# Patient Record
Sex: MALE | Race: WHITE | NOT HISPANIC OR LATINO | Employment: OTHER | URBAN - METROPOLITAN AREA
[De-identification: names, ages, dates, MRNs, and addresses within clinical notes are randomized per-mention and may not be internally consistent; named-entity substitution may affect disease eponyms.]

---

## 2023-07-05 ENCOUNTER — OFFICE VISIT (OUTPATIENT)
Dept: PRIMARY CARE | Facility: CLINIC | Age: 73
End: 2023-07-05
Payer: MEDICARE

## 2023-07-05 VITALS
TEMPERATURE: 98.4 F | HEIGHT: 70 IN | SYSTOLIC BLOOD PRESSURE: 152 MMHG | BODY MASS INDEX: 26.26 KG/M2 | DIASTOLIC BLOOD PRESSURE: 100 MMHG | WEIGHT: 183.4 LBS | OXYGEN SATURATION: 98 % | RESPIRATION RATE: 16 BRPM

## 2023-07-05 DIAGNOSIS — J40 BRONCHITIS: ICD-10-CM

## 2023-07-05 DIAGNOSIS — J30.2 SEASONAL ALLERGIC RHINITIS, UNSPECIFIED TRIGGER: Primary | ICD-10-CM

## 2023-07-05 DIAGNOSIS — R05.3 PERSISTENT COUGH: ICD-10-CM

## 2023-07-05 PROBLEM — R01.1 HEART MURMUR PREVIOUSLY UNDIAGNOSED: Status: ACTIVE | Noted: 2023-07-05

## 2023-07-05 PROBLEM — R03.0 ELEVATED BLOOD PRESSURE READING WITHOUT DIAGNOSIS OF HYPERTENSION: Status: ACTIVE | Noted: 2023-07-05

## 2023-07-05 PROCEDURE — 1036F TOBACCO NON-USER: CPT | Performed by: INTERNAL MEDICINE

## 2023-07-05 PROCEDURE — 1160F RVW MEDS BY RX/DR IN RCRD: CPT | Performed by: INTERNAL MEDICINE

## 2023-07-05 PROCEDURE — 99214 OFFICE O/P EST MOD 30 MIN: CPT | Performed by: INTERNAL MEDICINE

## 2023-07-05 PROCEDURE — 1159F MED LIST DOCD IN RCRD: CPT | Performed by: INTERNAL MEDICINE

## 2023-07-05 RX ORDER — AZITHROMYCIN 250 MG/1
TABLET, FILM COATED ORAL
Qty: 6 TABLET | Refills: 0 | Status: SHIPPED | OUTPATIENT
Start: 2023-07-05 | End: 2023-07-10

## 2023-07-05 RX ORDER — BENZONATATE 100 MG/1
200 CAPSULE ORAL 3 TIMES DAILY PRN
Qty: 42 CAPSULE | Refills: 0 | Status: SHIPPED | OUTPATIENT
Start: 2023-07-05 | End: 2023-07-21 | Stop reason: ALTCHOICE

## 2023-07-05 RX ORDER — FLUTICASONE PROPIONATE 50 MCG
2 SPRAY, SUSPENSION (ML) NASAL DAILY
Qty: 16 G | Refills: 5 | Status: SHIPPED | OUTPATIENT
Start: 2023-07-05 | End: 2023-07-21 | Stop reason: ALTCHOICE

## 2023-07-05 RX ORDER — METHYLPREDNISOLONE 4 MG/1
TABLET ORAL
Qty: 21 TABLET | Refills: 0 | Status: SHIPPED | OUTPATIENT
Start: 2023-07-05 | End: 2023-07-12

## 2023-07-05 ASSESSMENT — ENCOUNTER SYMPTOMS
OCCASIONAL FEELINGS OF UNSTEADINESS: 0
DEPRESSION: 0
COUGH: 1
LOSS OF SENSATION IN FEET: 0
WHEEZING: 1

## 2023-07-05 ASSESSMENT — PAIN SCALES - GENERAL: PAINLEVEL: 0-NO PAIN
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
## 2023-07-21 ENCOUNTER — OFFICE VISIT (OUTPATIENT)
Dept: PRIMARY CARE | Facility: CLINIC | Age: 73
End: 2023-07-21
Payer: MEDICARE

## 2023-07-21 VITALS
WEIGHT: 183 LBS | DIASTOLIC BLOOD PRESSURE: 98 MMHG | TEMPERATURE: 97.7 F | HEART RATE: 80 BPM | SYSTOLIC BLOOD PRESSURE: 142 MMHG | RESPIRATION RATE: 16 BRPM | BODY MASS INDEX: 26.64 KG/M2

## 2023-07-21 DIAGNOSIS — I10 PRIMARY HYPERTENSION: Primary | ICD-10-CM

## 2023-07-21 PROCEDURE — 1160F RVW MEDS BY RX/DR IN RCRD: CPT | Performed by: NURSE PRACTITIONER

## 2023-07-21 PROCEDURE — 1036F TOBACCO NON-USER: CPT | Performed by: NURSE PRACTITIONER

## 2023-07-21 PROCEDURE — 99213 OFFICE O/P EST LOW 20 MIN: CPT | Performed by: NURSE PRACTITIONER

## 2023-07-21 PROCEDURE — 1126F AMNT PAIN NOTED NONE PRSNT: CPT | Performed by: NURSE PRACTITIONER

## 2023-07-21 PROCEDURE — 3077F SYST BP >= 140 MM HG: CPT | Performed by: NURSE PRACTITIONER

## 2023-07-21 PROCEDURE — 1159F MED LIST DOCD IN RCRD: CPT | Performed by: NURSE PRACTITIONER

## 2023-07-21 PROCEDURE — 3080F DIAST BP >= 90 MM HG: CPT | Performed by: NURSE PRACTITIONER

## 2023-07-21 RX ORDER — LISINOPRIL 10 MG/1
10 TABLET ORAL DAILY
Qty: 30 TABLET | Refills: 5 | Status: SHIPPED | OUTPATIENT
Start: 2023-07-21 | End: 2023-08-23 | Stop reason: SDUPTHER

## 2023-07-21 ASSESSMENT — ENCOUNTER SYMPTOMS
WHEEZING: 0
FEVER: 0
DYSURIA: 0
SORE THROAT: 0
HEADACHES: 0
DIARRHEA: 0
EYE PAIN: 0
DECREASED CONCENTRATION: 0
EYE ITCHING: 0
CHILLS: 0
NERVOUS/ANXIOUS: 0
JOINT SWELLING: 0
SHORTNESS OF BREATH: 0
EYE DISCHARGE: 0
COLOR CHANGE: 0
COUGH: 0
NAUSEA: 0
ADENOPATHY: 0
RHINORRHEA: 0
DIFFICULTY URINATING: 0
CONSTIPATION: 0
PALPITATIONS: 0
BACK PAIN: 0
FATIGUE: 0
VOMITING: 0
MYALGIAS: 0
SINUS PRESSURE: 0

## 2023-07-21 NOTE — PATIENT INSTRUCTIONS
Patient to start taking lisinopril daily. Encouraged to check BP at home a few times per week. Follow-up in 1 month, and call the office if any problems or concerns in the meantime.

## 2023-07-21 NOTE — PROGRESS NOTES
Subjective   Patient ID: Jeramy Reynoso is a 72 y.o. male who presents for No chief complaint on file..  Pt here for blood pressure follow up    URI symptoms and cough have improved. He has not been checking his BP at home for the past few weeks. He has a cuff at home. He has never been on meds in the past.    He drinks water throughout the day, drinks 3-4 cups of coffee in the morning, rarely drinks espresso after dinner, also drinks liquor. Eats some salt with cooking, wife cooks everything from scratch. Rarely eat out, doesn't eat fast food or drink pop. No processed food at all.     Review of Systems   Constitutional:  Negative for chills, fatigue and fever.   HENT:  Negative for congestion, ear pain, rhinorrhea, sinus pressure and sore throat.    Eyes:  Negative for pain, discharge and itching.   Respiratory:  Negative for cough, shortness of breath and wheezing.    Cardiovascular:  Negative for chest pain and palpitations.   Gastrointestinal:  Negative for constipation, diarrhea, nausea and vomiting.   Genitourinary:  Negative for difficulty urinating and dysuria.   Musculoskeletal:  Negative for back pain, joint swelling and myalgias.   Skin:  Negative for color change.   Neurological:  Negative for headaches.   Hematological:  Negative for adenopathy.   Psychiatric/Behavioral:  Negative for decreased concentration. The patient is not nervous/anxious.        Objective     BP (!) 142/98   Pulse 80   Temp 36.5 °C (97.7 °F)   Resp 16   Wt 83 kg (183 lb)   BMI 26.64 kg/m²      Physical Exam  Constitutional:       General: He is not in acute distress.     Appearance: He is not ill-appearing.   HENT:      Head: Normocephalic and atraumatic.      Mouth/Throat:      Mouth: Mucous membranes are moist.      Pharynx: Oropharynx is clear.   Eyes:      Conjunctiva/sclera: Conjunctivae normal.      Pupils: Pupils are equal, round, and reactive to light.   Cardiovascular:      Rate and Rhythm: Normal rate and regular  rhythm.      Pulses: Normal pulses.      Heart sounds: Normal heart sounds.   Pulmonary:      Effort: Pulmonary effort is normal. No respiratory distress.      Breath sounds: Normal breath sounds.   Abdominal:      General: Bowel sounds are normal.      Palpations: Abdomen is soft.      Tenderness: There is no abdominal tenderness.   Musculoskeletal:         General: Normal range of motion.   Skin:     General: Skin is warm and dry.   Neurological:      General: No focal deficit present.      Mental Status: He is alert and oriented to person, place, and time.   Psychiatric:         Mood and Affect: Mood normal.         Behavior: Behavior normal.         Thought Content: Thought content normal.         Judgment: Judgment normal.         Assessment/Plan   Problem List Items Addressed This Visit    None  Visit Diagnoses       Primary hypertension    -  Primary    Relevant Medications    lisinopril 10 mg tablet            Patient Instructions   Patient to start taking lisinopril daily. Encouraged to check BP at home a few times per week. Follow-up in 1 month, and call the office if any problems or concerns in the meantime.

## 2023-08-23 ENCOUNTER — OFFICE VISIT (OUTPATIENT)
Dept: PRIMARY CARE | Facility: CLINIC | Age: 73
End: 2023-08-23
Payer: MEDICARE

## 2023-08-23 VITALS
OXYGEN SATURATION: 99 % | HEART RATE: 75 BPM | TEMPERATURE: 98.2 F | RESPIRATION RATE: 16 BRPM | SYSTOLIC BLOOD PRESSURE: 158 MMHG | BODY MASS INDEX: 26.08 KG/M2 | HEIGHT: 70 IN | WEIGHT: 182.2 LBS | DIASTOLIC BLOOD PRESSURE: 95 MMHG

## 2023-08-23 DIAGNOSIS — I10 PRIMARY HYPERTENSION: Primary | ICD-10-CM

## 2023-08-23 PROCEDURE — 3080F DIAST BP >= 90 MM HG: CPT | Performed by: INTERNAL MEDICINE

## 2023-08-23 PROCEDURE — 1126F AMNT PAIN NOTED NONE PRSNT: CPT | Performed by: INTERNAL MEDICINE

## 2023-08-23 PROCEDURE — 1036F TOBACCO NON-USER: CPT | Performed by: INTERNAL MEDICINE

## 2023-08-23 PROCEDURE — 1160F RVW MEDS BY RX/DR IN RCRD: CPT | Performed by: INTERNAL MEDICINE

## 2023-08-23 PROCEDURE — 3077F SYST BP >= 140 MM HG: CPT | Performed by: INTERNAL MEDICINE

## 2023-08-23 PROCEDURE — 99213 OFFICE O/P EST LOW 20 MIN: CPT | Performed by: INTERNAL MEDICINE

## 2023-08-23 PROCEDURE — 1159F MED LIST DOCD IN RCRD: CPT | Performed by: INTERNAL MEDICINE

## 2023-08-23 RX ORDER — LISINOPRIL 10 MG/1
10 TABLET ORAL DAILY
Qty: 90 TABLET | Refills: 3 | Status: SHIPPED | OUTPATIENT
Start: 2023-08-23 | End: 2023-11-09 | Stop reason: SDUPTHER

## 2023-08-23 ASSESSMENT — PAIN SCALES - GENERAL: PAINLEVEL: 0-NO PAIN

## 2023-08-23 ASSESSMENT — ENCOUNTER SYMPTOMS: HYPERTENSION: 1

## 2023-08-23 NOTE — ASSESSMENT & PLAN NOTE
Doing well with lisinopril and tolerating well.  Will continue to treat and observe and will follow up in 3 months for repeat BP.  Continue to take home BP measurements.  Check BMP today.

## 2023-08-23 NOTE — PROGRESS NOTES
"Subjective   Jeramy Reynoso is a 72 y.o. male who presents for Follow-up and Hypertension.      Patient has been checking BP at home for the last couple weeks- reading have been around 120/60-70   Patient states that he was feeling a little dizzy when first starting the lisinopril for the first 3 days but that has went away and feels good now.    Home BP are fine.   117-136/ 64-70      Hypertension  This is a new problem. The current episode started more than 1 month ago. The problem has been rapidly improving since onset. The problem is controlled. Past treatments include alpha 1 blockers. The current treatment provides significant improvement. There are no compliance problems.        Review of Systems   All other systems reviewed and are negative.      Objective   BP (!) 158/95   Pulse 75   Temp 36.8 °C (98.2 °F)   Resp 16   Ht 1.765 m (5' 9.5\")   Wt 82.6 kg (182 lb 3.2 oz)   SpO2 99%   BMI 26.52 kg/m²       Physical Exam  Constitutional:       Appearance: Normal appearance.   HENT:      Head: Normocephalic.   Pulmonary:      Effort: Pulmonary effort is normal.   Musculoskeletal:      Cervical back: Neck supple.      Right lower leg: No edema.      Left lower leg: No edema.   Skin:     General: Skin is warm and dry.   Psychiatric:         Mood and Affect: Mood normal.         Assessment/Plan   Problem List Items Addressed This Visit       Primary hypertension - Primary     Doing well with lisinopril and tolerating well.  Will continue to treat and observe and will follow up in 3 months for repeat BP.  Continue to take home BP measurements.  Check BMP today.         Relevant Medications    lisinopril 10 mg tablet    Other Relevant Orders    Basic metabolic panel     Encounter Diagnosis   Name Primary?    Primary hypertension Yes     Randal Balderrama, DO   "

## 2023-09-15 ENCOUNTER — LAB (OUTPATIENT)
Dept: LAB | Facility: LAB | Age: 73
End: 2023-09-15
Payer: MEDICARE

## 2023-09-15 DIAGNOSIS — I10 PRIMARY HYPERTENSION: ICD-10-CM

## 2023-09-15 LAB
ANION GAP IN SER/PLAS: 14 MMOL/L (ref 10–20)
CALCIUM (MG/DL) IN SER/PLAS: 9.1 MG/DL (ref 8.6–10.3)
CARBON DIOXIDE, TOTAL (MMOL/L) IN SER/PLAS: 27 MMOL/L (ref 21–32)
CHLORIDE (MMOL/L) IN SER/PLAS: 103 MMOL/L (ref 98–107)
CREATININE (MG/DL) IN SER/PLAS: 1.01 MG/DL (ref 0.5–1.3)
GFR MALE: 79 ML/MIN/1.73M2
GLUCOSE (MG/DL) IN SER/PLAS: 99 MG/DL (ref 74–99)
POTASSIUM (MMOL/L) IN SER/PLAS: 4.6 MMOL/L (ref 3.5–5.3)
SODIUM (MMOL/L) IN SER/PLAS: 139 MMOL/L (ref 136–145)
UREA NITROGEN (MG/DL) IN SER/PLAS: 14 MG/DL (ref 6–23)

## 2023-09-15 PROCEDURE — 36415 COLL VENOUS BLD VENIPUNCTURE: CPT

## 2023-09-15 PROCEDURE — 80048 BASIC METABOLIC PNL TOTAL CA: CPT

## 2023-11-09 ENCOUNTER — OFFICE VISIT (OUTPATIENT)
Dept: PRIMARY CARE | Facility: CLINIC | Age: 73
End: 2023-11-09
Payer: MEDICARE

## 2023-11-09 VITALS
DIASTOLIC BLOOD PRESSURE: 78 MMHG | OXYGEN SATURATION: 99 % | WEIGHT: 183 LBS | SYSTOLIC BLOOD PRESSURE: 136 MMHG | HEIGHT: 70 IN | TEMPERATURE: 97.8 F | RESPIRATION RATE: 16 BRPM | HEART RATE: 80 BPM | BODY MASS INDEX: 26.2 KG/M2

## 2023-11-09 DIAGNOSIS — Z00.00 ROUTINE GENERAL MEDICAL EXAMINATION AT HEALTH CARE FACILITY: Primary | ICD-10-CM

## 2023-11-09 DIAGNOSIS — Z23 NEED FOR VACCINATION: ICD-10-CM

## 2023-11-09 DIAGNOSIS — E78.2 MIXED HYPERLIPIDEMIA: ICD-10-CM

## 2023-11-09 DIAGNOSIS — Z13.6 SCREENING FOR CARDIOVASCULAR CONDITION: ICD-10-CM

## 2023-11-09 DIAGNOSIS — R01.1 HEART MURMUR PREVIOUSLY UNDIAGNOSED: ICD-10-CM

## 2023-11-09 DIAGNOSIS — Z11.59 NEED FOR HEPATITIS C SCREENING TEST: ICD-10-CM

## 2023-11-09 DIAGNOSIS — I10 PRIMARY HYPERTENSION: ICD-10-CM

## 2023-11-09 DIAGNOSIS — Z12.5 ENCOUNTER FOR PROSTATE CANCER SCREENING: ICD-10-CM

## 2023-11-09 PROBLEM — E78.5 HYPERLIPEMIA: Status: ACTIVE | Noted: 2023-11-09

## 2023-11-09 PROBLEM — I34.1 MYXOMATOUS MITRAL VALVE: Status: ACTIVE | Noted: 2023-11-09

## 2023-11-09 PROBLEM — I34.0 MILD MITRAL REGURGITATION: Status: ACTIVE | Noted: 2023-11-09

## 2023-11-09 PROCEDURE — 93000 ELECTROCARDIOGRAM COMPLETE: CPT | Performed by: INTERNAL MEDICINE

## 2023-11-09 PROCEDURE — 3075F SYST BP GE 130 - 139MM HG: CPT | Performed by: INTERNAL MEDICINE

## 2023-11-09 PROCEDURE — 90662 IIV NO PRSV INCREASED AG IM: CPT | Performed by: INTERNAL MEDICINE

## 2023-11-09 PROCEDURE — G0439 PPPS, SUBSEQ VISIT: HCPCS | Performed by: INTERNAL MEDICINE

## 2023-11-09 PROCEDURE — 1159F MED LIST DOCD IN RCRD: CPT | Performed by: INTERNAL MEDICINE

## 2023-11-09 PROCEDURE — 3078F DIAST BP <80 MM HG: CPT | Performed by: INTERNAL MEDICINE

## 2023-11-09 PROCEDURE — G0008 ADMIN INFLUENZA VIRUS VAC: HCPCS | Performed by: INTERNAL MEDICINE

## 2023-11-09 PROCEDURE — 1126F AMNT PAIN NOTED NONE PRSNT: CPT | Performed by: INTERNAL MEDICINE

## 2023-11-09 PROCEDURE — 1036F TOBACCO NON-USER: CPT | Performed by: INTERNAL MEDICINE

## 2023-11-09 PROCEDURE — 1170F FXNL STATUS ASSESSED: CPT | Performed by: INTERNAL MEDICINE

## 2023-11-09 PROCEDURE — 1160F RVW MEDS BY RX/DR IN RCRD: CPT | Performed by: INTERNAL MEDICINE

## 2023-11-09 PROCEDURE — 99213 OFFICE O/P EST LOW 20 MIN: CPT | Performed by: INTERNAL MEDICINE

## 2023-11-09 RX ORDER — LISINOPRIL 10 MG/1
10 TABLET ORAL DAILY
Qty: 90 TABLET | Refills: 3 | Status: SHIPPED | OUTPATIENT
Start: 2023-11-09 | End: 2024-11-08

## 2023-11-09 ASSESSMENT — ACTIVITIES OF DAILY LIVING (ADL)
MANAGING_FINANCES: INDEPENDENT
GROCERY_SHOPPING: INDEPENDENT
TAKING_MEDICATION: INDEPENDENT
BATHING: INDEPENDENT
DRESSING: INDEPENDENT
DOING_HOUSEWORK: INDEPENDENT

## 2023-11-09 ASSESSMENT — ENCOUNTER SYMPTOMS
DEPRESSION: 0
OCCASIONAL FEELINGS OF UNSTEADINESS: 0
LOSS OF SENSATION IN FEET: 0

## 2023-11-09 ASSESSMENT — PAIN SCALES - GENERAL: PAINLEVEL: 0-NO PAIN

## 2023-11-09 ASSESSMENT — PATIENT HEALTH QUESTIONNAIRE - PHQ9
2. FEELING DOWN, DEPRESSED OR HOPELESS: NOT AT ALL
SUM OF ALL RESPONSES TO PHQ9 QUESTIONS 1 AND 2: 0
1. LITTLE INTEREST OR PLEASURE IN DOING THINGS: NOT AT ALL

## 2023-11-09 NOTE — PROGRESS NOTES
"Subjective   Reason for Visit: Jeramy Reynoso is an 73 y.o. male here for a Medicare Wellness visit.     Past Medical, Surgical, and Family History reviewed and updated in chart.    Reviewed all medications by prescribing practitioner or clinical pharmacist (such as prescriptions, OTCs, herbal therapies and supplements) and documented in the medical record.    HPI    Patient Care Team:  Randal Balderrama DO as PCP - General  STANFORD Asher-CNP as PCP - O Medicare Advantage PCP     Review of Systems   Constitutional:  Negative for fever and unexpected weight change.   HENT:  Negative for congestion and ear pain.    Eyes:  Negative for visual disturbance.   Respiratory:  Negative for cough, chest tightness and shortness of breath.    Cardiovascular:  Negative for chest pain, palpitations and leg swelling.   Gastrointestinal:  Negative for abdominal pain, constipation, diarrhea, nausea and vomiting.   Endocrine: Negative for polydipsia and polyuria.   Genitourinary:  Negative for frequency and urgency.   Musculoskeletal:  Negative for arthralgias and neck pain.   Skin:  Negative for rash.   Neurological:  Negative for dizziness and headaches.   Psychiatric/Behavioral:  Negative for dysphoric mood.        Objective   Vitals:  /70   Pulse 80   Temp 36.6 °C (97.8 °F)   Resp 16   Ht 1.765 m (5' 9.5\")   Wt 83 kg (183 lb)   SpO2 99%   BMI 26.64 kg/m²       Physical Exam  Constitutional:       Appearance: Normal appearance.   HENT:      Head: Normocephalic.   Eyes:      Conjunctiva/sclera: Conjunctivae normal.   Cardiovascular:      Rate and Rhythm: Normal rate and regular rhythm.   Pulmonary:      Effort: Pulmonary effort is normal.      Breath sounds: Normal breath sounds.   Musculoskeletal:      Cervical back: Neck supple.   Skin:     General: Skin is warm and dry.   Neurological:      Mental Status: He is alert.         Assessment/Plan   Problem List Items Addressed This Visit    None  Visit " Diagnoses       Routine general medical examination at health care facility    -  Primary    Encounter for prostate cancer screening        Screening for cardiovascular condition        Relevant Orders    ECG 12 lead    Need for vaccination        Relevant Orders    Flu vaccine, high dose seasonal, preservative free    Need for hepatitis C screening test        Relevant Orders    Hepatitis C antibody

## 2023-11-09 NOTE — PROGRESS NOTES
Subjective   Jeramy Reynoso is a 73 y.o. male who presents for Medicare Annual Wellness Visit Subsequent.    Patient has a living will and POA but not on file   Patient declines his yearly flu vaccine   Last Cologuard was 11.22.2022    Patient has had a lot going on at home so has been stressed and his BP has been running a little higher then normal          Review of Systems    Objective   There were no vitals taken for this visit.      Physical Exam    Assessment/Plan   Problem List Items Addressed This Visit    None    No diagnosis found.  Niki Booker MA

## 2023-11-20 ASSESSMENT — ENCOUNTER SYMPTOMS
VOMITING: 0
ARTHRALGIAS: 0
SHORTNESS OF BREATH: 0
DIARRHEA: 0
DIZZINESS: 0
CHEST TIGHTNESS: 0
FEVER: 0
NAUSEA: 0
FREQUENCY: 0
PALPITATIONS: 0
ABDOMINAL PAIN: 0
HEADACHES: 0
POLYDIPSIA: 0
NECK PAIN: 0
CONSTIPATION: 0
COUGH: 0
DYSPHORIC MOOD: 0
UNEXPECTED WEIGHT CHANGE: 0

## 2023-12-01 ENCOUNTER — ANCILLARY PROCEDURE (OUTPATIENT)
Dept: RADIOLOGY | Facility: CLINIC | Age: 73
End: 2023-12-01
Payer: MEDICARE

## 2023-12-01 DIAGNOSIS — Z00.00 ROUTINE GENERAL MEDICAL EXAMINATION AT HEALTH CARE FACILITY: ICD-10-CM

## 2023-12-01 PROCEDURE — 75571 CT HRT W/O DYE W/CA TEST: CPT

## 2024-02-24 ENCOUNTER — APPOINTMENT (OUTPATIENT)
Dept: RADIOLOGY | Facility: HOSPITAL | Age: 74
DRG: 083 | End: 2024-02-24
Payer: MEDICARE

## 2024-02-24 ENCOUNTER — APPOINTMENT (OUTPATIENT)
Dept: CARDIOLOGY | Facility: HOSPITAL | Age: 74
DRG: 083 | End: 2024-02-24
Payer: MEDICARE

## 2024-02-24 ENCOUNTER — HOSPITAL ENCOUNTER (OUTPATIENT)
Facility: HOSPITAL | Age: 74
Setting detail: OBSERVATION
Discharge: HOME | DRG: 083 | End: 2024-02-25
Attending: STUDENT IN AN ORGANIZED HEALTH CARE EDUCATION/TRAINING PROGRAM | Admitting: INTERNAL MEDICINE
Payer: MEDICARE

## 2024-02-24 DIAGNOSIS — S01.01XA LACERATION OF SCALP, INITIAL ENCOUNTER: ICD-10-CM
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
## 2024-02-24 DIAGNOSIS — S22.39XA CLOSED FRACTURE OF ONE RIB, UNSPECIFIED LATERALITY, INITIAL ENCOUNTER: ICD-10-CM

## 2024-02-24 DIAGNOSIS — S06.6X1A TRAUMATIC SUBARACHNOID HEMORRHAGE WITH LOSS OF CONSCIOUSNESS OF 30 MINUTES OR LESS, INITIAL ENCOUNTER (MULTI): ICD-10-CM

## 2024-02-24 LAB
ABO GROUP (TYPE) IN BLOOD: NORMAL
ALBUMIN SERPL BCP-MCNC: 4.3 G/DL (ref 3.4–5)
ALP SERPL-CCNC: 64 U/L (ref 33–136)
ALT SERPL W P-5'-P-CCNC: 29 U/L (ref 10–52)
ANION GAP SERPL CALC-SCNC: 14 MMOL/L (ref 10–20)
ANTIBODY SCREEN: NORMAL
APAP SERPL-MCNC: <10 UG/ML
AST SERPL W P-5'-P-CCNC: 36 U/L (ref 9–39)
BASOPHILS # BLD AUTO: 0.02 X10*3/UL (ref 0–0.1)
BASOPHILS NFR BLD AUTO: 0.3 %
BILIRUB SERPL-MCNC: 0.8 MG/DL (ref 0–1.2)
BUN SERPL-MCNC: 15 MG/DL (ref 6–23)
CALCIUM SERPL-MCNC: 8.8 MG/DL (ref 8.6–10.3)
CHLORIDE SERPL-SCNC: 98 MMOL/L (ref 98–107)
CO2 SERPL-SCNC: 25 MMOL/L (ref 21–32)
CREAT SERPL-MCNC: 1.11 MG/DL (ref 0.5–1.3)
EGFRCR SERPLBLD CKD-EPI 2021: 70 ML/MIN/1.73M*2
EOSINOPHIL # BLD AUTO: 0.05 X10*3/UL (ref 0–0.4)
EOSINOPHIL NFR BLD AUTO: 0.7 %
ERYTHROCYTE [DISTWIDTH] IN BLOOD BY AUTOMATED COUNT: 12.9 % (ref 11.5–14.5)
ETHANOL SERPL-MCNC: 249 MG/DL
GLUCOSE SERPL-MCNC: 119 MG/DL (ref 74–99)
HCT VFR BLD AUTO: 38.4 % (ref 41–52)
HGB BLD-MCNC: 13.4 G/DL (ref 13.5–17.5)
IMM GRANULOCYTES # BLD AUTO: 0.03 X10*3/UL (ref 0–0.5)
IMM GRANULOCYTES NFR BLD AUTO: 0.4 % (ref 0–0.9)
INR PPP: 2 (ref 0.9–1.1)
LACTATE SERPL-SCNC: 1.4 MMOL/L (ref 0.4–2)
LYMPHOCYTES # BLD AUTO: 1.18 X10*3/UL (ref 0.8–3)
LYMPHOCYTES NFR BLD AUTO: 16.8 %
MCH RBC QN AUTO: 34.9 PG (ref 26–34)
MCHC RBC AUTO-ENTMCNC: 34.9 G/DL (ref 32–36)
MCV RBC AUTO: 100 FL (ref 80–100)
MONOCYTES # BLD AUTO: 0.37 X10*3/UL (ref 0.05–0.8)
MONOCYTES NFR BLD AUTO: 5.3 %
NEUTROPHILS # BLD AUTO: 5.38 X10*3/UL (ref 1.6–5.5)
NEUTROPHILS NFR BLD AUTO: 76.5 %
NRBC BLD-RTO: 0 /100 WBCS (ref 0–0)
PLATELET # BLD AUTO: 109 X10*3/UL (ref 150–450)
POTASSIUM SERPL-SCNC: 4.1 MMOL/L (ref 3.5–5.3)
PROT SERPL-MCNC: 7.1 G/DL (ref 6.4–8.2)
PROTHROMBIN TIME: 23.1 SECONDS (ref 9.8–12.8)
RBC # BLD AUTO: 3.84 X10*6/UL (ref 4.5–5.9)
RH FACTOR (ANTIGEN D): NORMAL
SALICYLATES SERPL-MCNC: <3 MG/DL
SODIUM SERPL-SCNC: 133 MMOL/L (ref 136–145)
WBC # BLD AUTO: 7 X10*3/UL (ref 4.4–11.3)

## 2024-02-24 PROCEDURE — 74177 CT ABD & PELVIS W/CONTRAST: CPT

## 2024-02-24 PROCEDURE — 72125 CT NECK SPINE W/O DYE: CPT

## 2024-02-24 PROCEDURE — 74177 CT ABD & PELVIS W/CONTRAST: CPT | Performed by: STUDENT IN AN ORGANIZED HEALTH CARE EDUCATION/TRAINING PROGRAM

## 2024-02-24 PROCEDURE — 80053 COMPREHEN METABOLIC PANEL: CPT | Performed by: STUDENT IN AN ORGANIZED HEALTH CARE EDUCATION/TRAINING PROGRAM

## 2024-02-24 PROCEDURE — 72128 CT CHEST SPINE W/O DYE: CPT

## 2024-02-24 PROCEDURE — 85025 COMPLETE CBC W/AUTO DIFF WBC: CPT | Performed by: STUDENT IN AN ORGANIZED HEALTH CARE EDUCATION/TRAINING PROGRAM

## 2024-02-24 PROCEDURE — 93005 ELECTROCARDIOGRAM TRACING: CPT

## 2024-02-24 PROCEDURE — 36415 COLL VENOUS BLD VENIPUNCTURE: CPT | Performed by: STUDENT IN AN ORGANIZED HEALTH CARE EDUCATION/TRAINING PROGRAM

## 2024-02-24 PROCEDURE — 83605 ASSAY OF LACTIC ACID: CPT | Performed by: STUDENT IN AN ORGANIZED HEALTH CARE EDUCATION/TRAINING PROGRAM

## 2024-02-24 PROCEDURE — 80143 DRUG ASSAY ACETAMINOPHEN: CPT | Performed by: STUDENT IN AN ORGANIZED HEALTH CARE EDUCATION/TRAINING PROGRAM

## 2024-02-24 PROCEDURE — 70450 CT HEAD/BRAIN W/O DYE: CPT | Performed by: STUDENT IN AN ORGANIZED HEALTH CARE EDUCATION/TRAINING PROGRAM

## 2024-02-24 PROCEDURE — 70450 CT HEAD/BRAIN W/O DYE: CPT

## 2024-02-24 PROCEDURE — 2550000001 HC RX 255 CONTRASTS: Performed by: STUDENT IN AN ORGANIZED HEALTH CARE EDUCATION/TRAINING PROGRAM

## 2024-02-24 PROCEDURE — 93010 ELECTROCARDIOGRAM REPORT: CPT | Performed by: INTERNAL MEDICINE

## 2024-02-24 PROCEDURE — 2500000004 HC RX 250 GENERAL PHARMACY W/ HCPCS (ALT 636 FOR OP/ED): Performed by: STUDENT IN AN ORGANIZED HEALTH CARE EDUCATION/TRAINING PROGRAM

## 2024-02-24 PROCEDURE — 86850 RBC ANTIBODY SCREEN: CPT | Performed by: STUDENT IN AN ORGANIZED HEALTH CARE EDUCATION/TRAINING PROGRAM

## 2024-02-24 PROCEDURE — 2500000004 HC RX 250 GENERAL PHARMACY W/ HCPCS (ALT 636 FOR OP/ED)

## 2024-02-24 PROCEDURE — 72128 CT CHEST SPINE W/O DYE: CPT | Performed by: STUDENT IN AN ORGANIZED HEALTH CARE EDUCATION/TRAINING PROGRAM

## 2024-02-24 PROCEDURE — 99291 CRITICAL CARE FIRST HOUR: CPT

## 2024-02-24 PROCEDURE — 96365 THER/PROPH/DIAG IV INF INIT: CPT

## 2024-02-24 PROCEDURE — 99291 CRITICAL CARE FIRST HOUR: CPT | Mod: 25 | Performed by: STUDENT IN AN ORGANIZED HEALTH CARE EDUCATION/TRAINING PROGRAM

## 2024-02-24 PROCEDURE — 85610 PROTHROMBIN TIME: CPT | Performed by: STUDENT IN AN ORGANIZED HEALTH CARE EDUCATION/TRAINING PROGRAM

## 2024-02-24 PROCEDURE — 72131 CT LUMBAR SPINE W/O DYE: CPT

## 2024-02-24 PROCEDURE — 71260 CT THORAX DX C+: CPT | Performed by: STUDENT IN AN ORGANIZED HEALTH CARE EDUCATION/TRAINING PROGRAM

## 2024-02-24 PROCEDURE — 72125 CT NECK SPINE W/O DYE: CPT | Performed by: STUDENT IN AN ORGANIZED HEALTH CARE EDUCATION/TRAINING PROGRAM

## 2024-02-24 PROCEDURE — G0390 TRAUMA RESPONS W/HOSP CRITI: HCPCS

## 2024-02-24 PROCEDURE — 72131 CT LUMBAR SPINE W/O DYE: CPT | Performed by: STUDENT IN AN ORGANIZED HEALTH CARE EDUCATION/TRAINING PROGRAM

## 2024-02-24 PROCEDURE — 99291 CRITICAL CARE FIRST HOUR: CPT | Performed by: STUDENT IN AN ORGANIZED HEALTH CARE EDUCATION/TRAINING PROGRAM

## 2024-02-24 RX ORDER — CEFAZOLIN SODIUM 2 G/50ML
2 SOLUTION INTRAVENOUS ONCE
Status: COMPLETED | OUTPATIENT
Start: 2024-02-24 | End: 2024-02-24

## 2024-02-24 RX ADMIN — CEFAZOLIN SODIUM 2 G: 2 SOLUTION INTRAVENOUS at 22:20

## 2024-02-24 RX ADMIN — IOHEXOL 100 ML: 350 INJECTION, SOLUTION INTRAVENOUS at 21:35

## 2024-02-24 RX ADMIN — SODIUM CHLORIDE 1000 ML: 9 INJECTION, SOLUTION INTRAVENOUS at 23:05

## 2024-02-24 ASSESSMENT — LIFESTYLE VARIABLES
EVER HAD A DRINK FIRST THING IN THE MORNING TO STEADY YOUR NERVES TO GET RID OF A HANGOVER: NO
HAVE PEOPLE ANNOYED YOU BY CRITICIZING YOUR DRINKING: NO
HAVE YOU EVER FELT YOU SHOULD CUT DOWN ON YOUR DRINKING: NO
EVER FELT BAD OR GUILTY ABOUT YOUR DRINKING: NO

## 2024-02-24 ASSESSMENT — PAIN SCALES - GENERAL: PAINLEVEL_OUTOF10: 0 - NO PAIN

## 2024-02-24 ASSESSMENT — PAIN - FUNCTIONAL ASSESSMENT: PAIN_FUNCTIONAL_ASSESSMENT: 0-10

## 2024-02-25 ENCOUNTER — APPOINTMENT (OUTPATIENT)
Dept: RADIOLOGY | Facility: HOSPITAL | Age: 74
DRG: 083 | End: 2024-02-25
Payer: MEDICARE

## 2024-02-25 VITALS
RESPIRATION RATE: 16 BRPM | BODY MASS INDEX: 26.67 KG/M2 | DIASTOLIC BLOOD PRESSURE: 78 MMHG | SYSTOLIC BLOOD PRESSURE: 142 MMHG | WEIGHT: 186.29 LBS | HEIGHT: 70 IN | TEMPERATURE: 98.2 F | HEART RATE: 104 BPM | OXYGEN SATURATION: 98 %

## 2024-02-25 LAB
ALBUMIN SERPL BCP-MCNC: 3.5 G/DL (ref 3.4–5)
ANION GAP SERPL CALC-SCNC: 13 MMOL/L (ref 10–20)
BASOPHILS # BLD AUTO: 0.02 X10*3/UL (ref 0–0.1)
BASOPHILS NFR BLD AUTO: 0.3 %
BUN SERPL-MCNC: 11 MG/DL (ref 6–23)
CALCIUM SERPL-MCNC: 7.9 MG/DL (ref 8.6–10.3)
CHLORIDE SERPL-SCNC: 107 MMOL/L (ref 98–107)
CO2 SERPL-SCNC: 19 MMOL/L (ref 21–32)
CREAT SERPL-MCNC: 0.9 MG/DL (ref 0.5–1.3)
EGFRCR SERPLBLD CKD-EPI 2021: 90 ML/MIN/1.73M*2
EOSINOPHIL # BLD AUTO: 0.01 X10*3/UL (ref 0–0.4)
EOSINOPHIL NFR BLD AUTO: 0.2 %
ERYTHROCYTE [DISTWIDTH] IN BLOOD BY AUTOMATED COUNT: 12.7 % (ref 11.5–14.5)
GLUCOSE SERPL-MCNC: 126 MG/DL (ref 74–99)
HCT VFR BLD AUTO: 38.9 % (ref 41–52)
HGB BLD-MCNC: 12.9 G/DL (ref 13.5–17.5)
IMM GRANULOCYTES # BLD AUTO: 0.02 X10*3/UL (ref 0–0.5)
IMM GRANULOCYTES NFR BLD AUTO: 0.3 % (ref 0–0.9)
LYMPHOCYTES # BLD AUTO: 1.11 X10*3/UL (ref 0.8–3)
LYMPHOCYTES NFR BLD AUTO: 17.5 %
MAGNESIUM SERPL-MCNC: 1.98 MG/DL (ref 1.6–2.4)
MCH RBC QN AUTO: 33.9 PG (ref 26–34)
MCHC RBC AUTO-ENTMCNC: 33.2 G/DL (ref 32–36)
MCV RBC AUTO: 102 FL (ref 80–100)
MONOCYTES # BLD AUTO: 0.43 X10*3/UL (ref 0.05–0.8)
MONOCYTES NFR BLD AUTO: 6.8 %
NEUTROPHILS # BLD AUTO: 4.74 X10*3/UL (ref 1.6–5.5)
NEUTROPHILS NFR BLD AUTO: 74.9 %
NRBC BLD-RTO: 0 /100 WBCS (ref 0–0)
PHOSPHATE SERPL-MCNC: 2.3 MG/DL (ref 2.5–4.9)
PLATELET # BLD AUTO: 107 X10*3/UL (ref 150–450)
POTASSIUM SERPL-SCNC: 4.1 MMOL/L (ref 3.5–5.3)
RBC # BLD AUTO: 3.8 X10*6/UL (ref 4.5–5.9)
SODIUM SERPL-SCNC: 135 MMOL/L (ref 136–145)
WBC # BLD AUTO: 6.3 X10*3/UL (ref 4.4–11.3)

## 2024-02-25 PROCEDURE — 2500000001 HC RX 250 WO HCPCS SELF ADMINISTERED DRUGS (ALT 637 FOR MEDICARE OP)

## 2024-02-25 PROCEDURE — 70450 CT HEAD/BRAIN W/O DYE: CPT | Performed by: STUDENT IN AN ORGANIZED HEALTH CARE EDUCATION/TRAINING PROGRAM

## 2024-02-25 PROCEDURE — 36415 COLL VENOUS BLD VENIPUNCTURE: CPT

## 2024-02-25 PROCEDURE — 99221 1ST HOSP IP/OBS SF/LOW 40: CPT | Performed by: NEUROLOGICAL SURGERY

## 2024-02-25 PROCEDURE — 85025 COMPLETE CBC W/AUTO DIFF WBC: CPT

## 2024-02-25 PROCEDURE — G0378 HOSPITAL OBSERVATION PER HR: HCPCS

## 2024-02-25 PROCEDURE — 99238 HOSP IP/OBS DSCHRG MGMT 30/<: CPT

## 2024-02-25 PROCEDURE — 99221 1ST HOSP IP/OBS SF/LOW 40: CPT | Performed by: SURGERY

## 2024-02-25 PROCEDURE — 80069 RENAL FUNCTION PANEL: CPT

## 2024-02-25 PROCEDURE — 83735 ASSAY OF MAGNESIUM: CPT

## 2024-02-25 PROCEDURE — 70450 CT HEAD/BRAIN W/O DYE: CPT

## 2024-02-25 PROCEDURE — 2020000001 HC ICU ROOM DAILY

## 2024-02-25 RX ORDER — LIDOCAINE 50 MG/G
1 PATCH TOPICAL DAILY
Qty: 30 PATCH | Refills: 0 | Status: SHIPPED | OUTPATIENT
Start: 2024-02-25 | End: 2024-03-15

## 2024-02-25 RX ORDER — LISINOPRIL 10 MG/1
10 TABLET ORAL DAILY
Status: DISCONTINUED | OUTPATIENT
Start: 2024-02-25 | End: 2024-02-25 | Stop reason: HOSPADM

## 2024-02-25 RX ORDER — OXYCODONE HYDROCHLORIDE 5 MG/1
5 TABLET ORAL EVERY 6 HOURS PRN
Qty: 5 TABLET | Refills: 0 | Status: SHIPPED | OUTPATIENT
Start: 2024-02-25 | End: 2024-05-16 | Stop reason: ALTCHOICE

## 2024-02-25 RX ORDER — ASCORBIC ACID 1000 MG
175 TABLET ORAL DAILY
COMMUNITY

## 2024-02-25 RX ORDER — LABETALOL HYDROCHLORIDE 5 MG/ML
10 INJECTION, SOLUTION INTRAVENOUS EVERY 4 HOURS PRN
Status: DISCONTINUED | OUTPATIENT
Start: 2024-02-25 | End: 2024-02-25 | Stop reason: HOSPADM

## 2024-02-25 RX ADMIN — LISINOPRIL 10 MG: 10 TABLET ORAL at 08:45

## 2024-02-25 SDOH — SOCIAL STABILITY: SOCIAL INSECURITY: DO YOU FEEL ANYONE HAS EXPLOITED OR TAKEN ADVANTAGE OF YOU FINANCIALLY OR OF YOUR PERSONAL PROPERTY?: NO

## 2024-02-25 SDOH — SOCIAL STABILITY: SOCIAL INSECURITY: ARE THERE ANY APPARENT SIGNS OF INJURIES/BEHAVIORS THAT COULD BE RELATED TO ABUSE/NEGLECT?: NO

## 2024-02-25 SDOH — SOCIAL STABILITY: SOCIAL INSECURITY: ARE YOU OR HAVE YOU BEEN THREATENED OR ABUSED PHYSICALLY, EMOTIONALLY, OR SEXUALLY BY ANYONE?: NO

## 2024-02-25 SDOH — SOCIAL STABILITY: SOCIAL INSECURITY: DO YOU FEEL UNSAFE GOING BACK TO THE PLACE WHERE YOU ARE LIVING?: NO

## 2024-02-25 SDOH — SOCIAL STABILITY: SOCIAL INSECURITY: WERE YOU ABLE TO COMPLETE ALL THE BEHAVIORAL HEALTH SCREENINGS?: YES

## 2024-02-25 SDOH — SOCIAL STABILITY: SOCIAL INSECURITY: DOES ANYONE TRY TO KEEP YOU FROM HAVING/CONTACTING OTHER FRIENDS OR DOING THINGS OUTSIDE YOUR HOME?: NO

## 2024-02-25 SDOH — SOCIAL STABILITY: SOCIAL INSECURITY: HAS ANYONE EVER THREATENED TO HURT YOUR FAMILY OR YOUR PETS?: NO

## 2024-02-25 SDOH — SOCIAL STABILITY: SOCIAL INSECURITY: HAVE YOU HAD THOUGHTS OF HARMING ANYONE ELSE?: NO

## 2024-02-25 SDOH — SOCIAL STABILITY: SOCIAL INSECURITY: ABUSE: ADULT

## 2024-02-25 ASSESSMENT — COLUMBIA-SUICIDE SEVERITY RATING SCALE - C-SSRS
1. IN THE PAST MONTH, HAVE YOU WISHED YOU WERE DEAD OR WISHED YOU COULD GO TO SLEEP AND NOT WAKE UP?: NO
2. HAVE YOU ACTUALLY HAD ANY THOUGHTS OF KILLING YOURSELF?: NO
6. HAVE YOU EVER DONE ANYTHING, STARTED TO DO ANYTHING, OR PREPARED TO DO ANYTHING TO END YOUR LIFE?: NO

## 2024-02-25 ASSESSMENT — COGNITIVE AND FUNCTIONAL STATUS - GENERAL
MOBILITY SCORE: 18
TURNING FROM BACK TO SIDE WHILE IN FLAT BAD: A LITTLE
STANDING UP FROM CHAIR USING ARMS: A LITTLE
WALKING IN HOSPITAL ROOM: A LITTLE
DRESSING REGULAR UPPER BODY CLOTHING: A LITTLE
TURNING FROM BACK TO SIDE WHILE IN FLAT BAD: A LITTLE
HELP NEEDED FOR BATHING: A LITTLE
WALKING IN HOSPITAL ROOM: A LITTLE
MOBILITY SCORE: 18
DAILY ACTIVITIY SCORE: 20
STANDING UP FROM CHAIR USING ARMS: A LITTLE
DRESSING REGULAR LOWER BODY CLOTHING: A LITTLE
DAILY ACTIVITIY SCORE: 20
MOVING TO AND FROM BED TO CHAIR: A LITTLE
MOVING TO AND FROM BED TO CHAIR: A LITTLE
CLIMB 3 TO 5 STEPS WITH RAILING: A LITTLE
DRESSING REGULAR UPPER BODY CLOTHING: A LITTLE
PATIENT BASELINE BEDBOUND: NO
TOILETING: A LITTLE
HELP NEEDED FOR BATHING: A LITTLE
CLIMB 3 TO 5 STEPS WITH RAILING: A LITTLE
MOVING FROM LYING ON BACK TO SITTING ON SIDE OF FLAT BED WITH BEDRAILS: A LITTLE
MOVING FROM LYING ON BACK TO SITTING ON SIDE OF FLAT BED WITH BEDRAILS: A LITTLE
DRESSING REGULAR LOWER BODY CLOTHING: A LITTLE
TOILETING: A LITTLE

## 2024-02-25 ASSESSMENT — PAIN - FUNCTIONAL ASSESSMENT
PAIN_FUNCTIONAL_ASSESSMENT: 0-10

## 2024-02-25 ASSESSMENT — ACTIVITIES OF DAILY LIVING (ADL)
TOILETING: INDEPENDENT
GROOMING: INDEPENDENT
JUDGMENT_ADEQUATE_SAFELY_COMPLETE_DAILY_ACTIVITIES: YES
BATHING: INDEPENDENT
WALKS IN HOME: INDEPENDENT
PATIENT'S MEMORY ADEQUATE TO SAFELY COMPLETE DAILY ACTIVITIES?: YES
HEARING - LEFT EAR: FUNCTIONAL
FEEDING YOURSELF: INDEPENDENT
ADEQUATE_TO_COMPLETE_ADL: YES
LACK_OF_TRANSPORTATION: NO
ASSISTIVE_DEVICE: EYEGLASSES
HEARING - RIGHT EAR: FUNCTIONAL
LACK_OF_TRANSPORTATION: NO
DRESSING YOURSELF: NEEDS ASSISTANCE

## 2024-02-25 ASSESSMENT — LIFESTYLE VARIABLES
SUBSTANCE_ABUSE_PAST_12_MONTHS: YES
AUDIT TOTAL SCORE: 5
HOW OFTEN DURING THE LAST YEAR HAVE YOU NEEDED AN ALCOHOLIC DRINK FIRST THING IN THE MORNING TO GET YOURSELF GOING AFTER A NIGHT OF HEAVY DRINKING: NEVER
AUDIT-C TOTAL SCORE: 5
HOW OFTEN DURING THE LAST YEAR HAVE YOU FOUND THAT YOU WERE NOT ABLE TO STOP DRINKING ONCE YOU HAD STARTED: NEVER
HAS A RELATIVE, FRIEND, DOCTOR, OR ANOTHER HEALTH PROFESSIONAL EXPRESSED CONCERN ABOUT YOUR DRINKING OR SUGGESTED YOU CUT DOWN: NO
HOW OFTEN DO YOU HAVE A DRINK CONTAINING ALCOHOL: 4 OR MORE TIMES A WEEK
HOW OFTEN DURING THE LAST YEAR HAVE YOU BEEN UNABLE TO REMEMBER WHAT HAPPENED THE NIGHT BEFORE BECAUSE YOU HAD BEEN DRINKING: NEVER
HOW OFTEN DURING THE LAST YEAR HAVE YOU HAD A FEELING OF GUILT OR REMORSE AFTER DRINKING: NEVER
PRESCIPTION_ABUSE_PAST_12_MONTHS: NO
HOW OFTEN DO YOU HAVE 6 OR MORE DRINKS ON ONE OCCASION: NEVER
AUDIT TOTAL SCORE: 0
AUDIT-C TOTAL SCORE: 5
HAVE YOU OR SOMEONE ELSE BEEN INJURED AS A RESULT OF YOUR DRINKING: NO
SKIP TO QUESTIONS 9-10: 0
HOW MANY STANDARD DRINKS CONTAINING ALCOHOL DO YOU HAVE ON A TYPICAL DAY: 3 OR 4
HOW OFTEN DURING THE LAST YEAR HAVE YOU FAILED TO DO WHAT WAS NORMALLY EXPECTED FROM YOU BECAUSE OF DRINKING: NEVER

## 2024-02-25 ASSESSMENT — PAIN SCALES - GENERAL
PAINLEVEL_OUTOF10: 0 - NO PAIN
PAINLEVEL_OUTOF10: 8
PAINLEVEL_OUTOF10: 0 - NO PAIN
PAINLEVEL_OUTOF10: 8

## 2024-02-25 ASSESSMENT — PATIENT HEALTH QUESTIONNAIRE - PHQ9
SUM OF ALL RESPONSES TO PHQ9 QUESTIONS 1 & 2: 0
1. LITTLE INTEREST OR PLEASURE IN DOING THINGS: NOT AT ALL
2. FEELING DOWN, DEPRESSED OR HOPELESS: NOT AT ALL

## 2024-02-25 NOTE — NURSING NOTE
Patient returned from CT scan of head.  No changes in patient condition during transport or testing.

## 2024-02-25 NOTE — DISCHARGE SUMMARY
Discharge Diagnosis  Fall, initial encounter    Issues Requiring Follow-Up  Laceration on the occipital region , Acute nondisplaced fracture of the posterior left 5th rib     Test Results Pending At Discharge  Pending Labs       No current pending labs.            Hospital Course   Jeramy Reynoso is a 73 y.o. male presenting after a mechanical fall secondary to intoxication at home. approximately around 8 PM last night. Past medical history is notable for mitral regurgitation, HLD, hypertension. His wife heard a loud noise from a different room.  He was found on the ground bleeding from the back of his head down several stairs on a concrete floor. He was reportedly confused at the time.  Unknown loss of consciousness.  Patient reportedly had 3 alcoholic drinks this evening and this is standard for him on a daily basis.  Patient's mental status gradually improved throughout his time in the emergency department.  He is not on blood thinners. He was admitted to the ICU on Neurosurgery recommended for frequent neurochecks in the ICU with repeat head CT in 6 hours. Repeat CT head shows slight decrease in amount of subarachnoid hemorrhage along the right parietal lobe. No new or enlarging acute intracranial  Hemorrhage, and decreased size of left parietal scalp hematoma. The patient had no over night event. During rounds, he is A&Ox3, hemodynamically stable, neurologically intact, no sensory or motor deficit, No respiratory distress. Tolerated oral intake, Denies headache, dizziness, lightheadedness, no chest pain or SOB, no nausea, vomiting, abdominal pain or diarrhea, or urinary symptom. He was seen by Trauma Surgery and Neurosurgery team and was cleared to be discharged for outpatient Neurosurgery follow up. He was also cleared by for discharge by the intensive care team pending PT/OT evaluation. Cleared to go home by physical therapy. The patient was discharged in a stable condition to follow up with his PCP and  Neurosurgery.        ED: on arrival, the patient initial GCS of 14 secondary to confusion. Secondary survey remarkable for laceration to the posterior scalp. Full body CT imaging was obtained to evaluate for extent of injury. CT head revealed left parietal scalp hematoma, and subarachnoid bleed in the right parietal region with no mass effect. CT imaging also revealed acute rib fracture of the left fifth rib, nondisplaced. Patient was given 1 dose of Ancef in ED. Remaining lab work reviewed, remarkable for alcohol level of 249, pro time of 23.1 and INR of 2.0. Patient had stable vital signs, ANO x 4. He had no acute deficits on neurological exam.     Results for orders placed or performed during the hospital encounter of 02/24/24 (from the past 24 hour(s))   Comprehensive Metabolic Panel   Result Value Ref Range    Glucose 119 (H) 74 - 99 mg/dL    Sodium 133 (L) 136 - 145 mmol/L    Potassium 4.1 3.5 - 5.3 mmol/L    Chloride 98 98 - 107 mmol/L    Bicarbonate 25 21 - 32 mmol/L    Anion Gap 14 10 - 20 mmol/L    Urea Nitrogen 15 6 - 23 mg/dL    Creatinine 1.11 0.50 - 1.30 mg/dL    eGFR 70 >60 mL/min/1.73m*2    Calcium 8.8 8.6 - 10.3 mg/dL    Albumin 4.3 3.4 - 5.0 g/dL    Alkaline Phosphatase 64 33 - 136 U/L    Total Protein 7.1 6.4 - 8.2 g/dL    AST 36 9 - 39 U/L    Bilirubin, Total 0.8 0.0 - 1.2 mg/dL    ALT 29 10 - 52 U/L   Lactate   Result Value Ref Range    Lactate 1.4 0.4 - 2.0 mmol/L   Protime-INR   Result Value Ref Range    Protime 23.1 (H) 9.8 - 12.8 seconds    INR 2.0 (H) 0.9 - 1.1   Type And Screen   Result Value Ref Range    ABO TYPE A     Rh TYPE POS     ANTIBODY SCREEN NEG    Acute Toxicology Panel, Blood   Result Value Ref Range    Acetaminophen <10.0 10.0 - 30.0 ug/mL    Salicylate  <3 4 - 20 mg/dL    Alcohol 249 (H) <=10 mg/dL   CBC and Auto Differential   Result Value Ref Range    WBC 7.0 4.4 - 11.3 x10*3/uL    nRBC 0.0 0.0 - 0.0 /100 WBCs    RBC 3.84 (L) 4.50 - 5.90 x10*6/uL    Hemoglobin 13.4 (L) 13.5  - 17.5 g/dL    Hematocrit 38.4 (L) 41.0 - 52.0 %     80 - 100 fL    MCH 34.9 (H) 26.0 - 34.0 pg    MCHC 34.9 32.0 - 36.0 g/dL    RDW 12.9 11.5 - 14.5 %    Platelets 109 (L) 150 - 450 x10*3/uL    Neutrophils % 76.5 40.0 - 80.0 %    Immature Granulocytes %, Automated 0.4 0.0 - 0.9 %    Lymphocytes % 16.8 13.0 - 44.0 %    Monocytes % 5.3 2.0 - 10.0 %    Eosinophils % 0.7 0.0 - 6.0 %    Basophils % 0.3 0.0 - 2.0 %    Neutrophils Absolute 5.38 1.60 - 5.50 x10*3/uL    Immature Granulocytes Absolute, Automated 0.03 0.00 - 0.50 x10*3/uL    Lymphocytes Absolute 1.18 0.80 - 3.00 x10*3/uL    Monocytes Absolute 0.37 0.05 - 0.80 x10*3/uL    Eosinophils Absolute 0.05 0.00 - 0.40 x10*3/uL    Basophils Absolute 0.02 0.00 - 0.10 x10*3/uL   CBC and Auto Differential   Result Value Ref Range    WBC 6.3 4.4 - 11.3 x10*3/uL    nRBC 0.0 0.0 - 0.0 /100 WBCs    RBC 3.80 (L) 4.50 - 5.90 x10*6/uL    Hemoglobin 12.9 (L) 13.5 - 17.5 g/dL    Hematocrit 38.9 (L) 41.0 - 52.0 %     (H) 80 - 100 fL    MCH 33.9 26.0 - 34.0 pg    MCHC 33.2 32.0 - 36.0 g/dL    RDW 12.7 11.5 - 14.5 %    Platelets 107 (L) 150 - 450 x10*3/uL    Neutrophils % 74.9 40.0 - 80.0 %    Immature Granulocytes %, Automated 0.3 0.0 - 0.9 %    Lymphocytes % 17.5 13.0 - 44.0 %    Monocytes % 6.8 2.0 - 10.0 %    Eosinophils % 0.2 0.0 - 6.0 %    Basophils % 0.3 0.0 - 2.0 %    Neutrophils Absolute 4.74 1.60 - 5.50 x10*3/uL    Immature Granulocytes Absolute, Automated 0.02 0.00 - 0.50 x10*3/uL    Lymphocytes Absolute 1.11 0.80 - 3.00 x10*3/uL    Monocytes Absolute 0.43 0.05 - 0.80 x10*3/uL    Eosinophils Absolute 0.01 0.00 - 0.40 x10*3/uL    Basophils Absolute 0.02 0.00 - 0.10 x10*3/uL   Magnesium   Result Value Ref Range    Magnesium 1.98 1.60 - 2.40 mg/dL   Renal function panel   Result Value Ref Range    Glucose 126 (H) 74 - 99 mg/dL    Sodium 135 (L) 136 - 145 mmol/L    Potassium 4.1 3.5 - 5.3 mmol/L    Chloride 107 98 - 107 mmol/L    Bicarbonate 19 (L) 21 - 32 mmol/L     Anion Gap 13 10 - 20 mmol/L    Urea Nitrogen 11 6 - 23 mg/dL    Creatinine 0.90 0.50 - 1.30 mg/dL    eGFR 90 >60 mL/min/1.73m*2    Calcium 7.9 (L) 8.6 - 10.3 mg/dL    Phosphorus 2.3 (L) 2.5 - 4.9 mg/dL    Albumin 3.5 3.4 - 5.0 g/dL          CT head wo IV contrast    Result Date: 2/25/2024  Interpreted By:  Axel Barone, STUDY: CT HEAD WO IV CONTRAST;  2/25/2024 4:43 am   INDICATION: Signs/Symptoms:interval head CT for SAH.   COMPARISON: 02/24/2024   ACCESSION NUMBER(S): [id number]   ORDERING CLINICIAN: MELE GONG   TECHNIQUE: Noncontrast axial CT images of head were obtained with coronal and sagittal reconstructed images.   FINDINGS: BRAIN PARENCHYMA:  No acute intraparenchymal hemorrhage or parenchymal evidence of acute large territory ischemic infarct. No mass-effect. Gray-white matter distinction is preserved.   VENTRICLES and EXTRA-AXIAL SPACES:  Decreased amount of subarachnoid hemorrhage overlying the right parietal lobe. No effacement of cerebral sulci. Ventricles and sulci are age-concordant.   PARANASAL SINUSES/MASTOIDS:  No hemorrhage or air-fluid levels within the visualized paranasal sinuses. The mastoids are well aerated.   CALVARIUM/ORBITS:  No skull fracture.  The orbits and globes are intact to the extent visualized.   EXTRACRANIAL SOFT TISSUES: Significant interval decreased size of the left scalp hematoma.       1. Slight decrease in amount of subarachnoid hemorrhage along the right parietal lobe. No new or enlarging acute intracranial hemorrhage.   2. Decreased size of left parietal scalp hematoma.     MACRO: None.   Signed by: Axel Barone 2/25/2024 4:55 AM Dictation workstation:   [id number]    CT thoracic spine wo IV contrast    Result Date: 2/24/2024  Interpreted By:  Axel Barone, STUDY: CT CHEST ABDOMEN PELVIS W IV CONTRAST; CT THORACIC SPINE WO IV CONTRAST; CT LUMBAR SPINE WO IV CONTRAST;  2/24/2024 9:35 pm   INDICATION: Signs/Symptoms:Trauma.   COMPARISON: None.    ACCESSION NUMBER(S): [id number]; [id number]; [id number]   ORDERING CLINICIAN: SALAS CARRERO   TECHNIQUE: Contiguous axial images of the chest, abdomen, and pelvis were obtained after the intravenous administration of 100 mL Omnipaque 350 contrast.  Coronal and sagittal reformatted images were reconstructed from the axial data.   Multiplanar reformatted images of the thoracic and lumbar spine were reconstructed from source data of concurrent CT chest/abdomen/pelvis acquisition.   FINDINGS: CT CHEST:   MEDIASTINUM AND LYMPH NODES:  The esophagus appears within normal limits.  No enlarged intrathoracic or axillary lymph nodes by imaging criteria. No pneumomediastinum.   VESSELS:  Normal caliber thoracic aorta. No evidence of traumatic aortic injury. No significant aortic atherosclerosis.   HEART: Cardiomegaly. The pulmonary vasculature is within normal limits.  Mild coronary artery calcifications. No significant pericardial effusion.   LUNG, AIRWAYS, PLEURA: There is mosaic perfusion mild left lower lobe atelectasis. Most likely on the basis of air trapping from distal airways disease. No consolidation, pulmonary edema, pleural effusion or pneumothorax.   CHEST WALL SOFT TISSUES: No discernible acute abnormality.   OSSEOUS STRUCTURES: There is an acute nondisplaced fracture of the posterior left 5th rib with mild extrapleural soft tissue swelling.     CT ABDOMEN/PELVIS:   ABDOMINAL WALL: No acute abnormality.   LIVER: No acute abnormality. No suspicious lesions.   BILE DUCTS: No significant intrahepatic or extrahepatic dilatation.   GALLBLADDER: No significant abnormality.   SPLEEN: No significant abnormality.   PANCREAS: No significant abnormality.   ADRENALS: No significant abnormality.   KIDNEYS, URETERS, BLADDER: No significant abnormality.   REPRODUCTIVE ORGANS: The prostate gland is enlarged, measuring 5.7 cm in transverse dimension.   VESSELS: No acute vascular injury. Mild aortic atherosclerosis  without AAA.   LYMPH NODES/RETROPERITONEUM: No acute retroperitoneal abnormality. No enlarged lymph nodes.   BOWEL/MESENTERY/PERITONEUM:  No bowel wall thickening or dilatation. Normal appendix. No ascites, free air or fluid collection.   MUSCULOSKELETAL: No acute osseous abnormality. Loose body in the right hip joint. Mild bilateral hip osteoarthrosis.     CT THORACIC SPINE:   PARASPINAL SOFT TISSUES: No paravertebral fluid collection or significant edema. ALIGNMENT:  No traumatic spondylolisthesis or traumatic facet widening. VERTEBRAE: Minimal depressions of the upper T3 and T4 endplates that demonstrated chronic appearance. No acute fracture. SPINAL CANAL/INTERVERTEBRAL DISCS: No high-grade spinal canal stenosis. No significant disc height loss. Minor endplate spurring, scattered minimal disc spur complexes.     CT LUMBAR SPINE:   PARASPINAL SOFT TISSUES: No paravertebral fluid collection or significant edema. ALIGNMENT:  No traumatic spondylolisthesis or traumatic facet widening. VERTEBRAE:  No acute fracture.  Vertebral body heights are maintained. SPINAL CANAL/INTERVERTEBRAL DISCS:  Mild diffuse disc bulges at L1-2 through L4-5, severe L5-S1 disc height loss with small broad-based disc spur complex. No significant canal stenosis. NEURAL FORAMINA: No significant neural foraminal stenosis.       CT CHEST/ABDOMEN/PELVIS: 1. Acute nondisplaced fracture of the posterior left 5th rib. 2. No other acute traumatic injury in the chest, abdomen, or pelvis. 3. Enlarged prostate gland.   CT THORACIC AND LUMBAR SPINE: 1. No acute fracture or traumatic malalignment. 2. Chronic appearing minimally depressed compression deformities of the superior T3 and T4 endplates.   MACRO: None.   Signed by: Axel Barone 2/24/2024 9:54 PM Dictation workstation:   [id number]    CT lumbar spine wo IV contrast    Result Date: 2/24/2024  Interpreted By:  Axel Barone, STUDY: CT CHEST ABDOMEN PELVIS W IV CONTRAST; CT THORACIC SPINE  WO IV CONTRAST; CT LUMBAR SPINE WO IV CONTRAST;  2/24/2024 9:35 pm   INDICATION: Signs/Symptoms:Trauma.   COMPARISON: None.   ACCESSION NUMBER(S): [id number]; [id number]; [id number]   ORDERING CLINICIAN: SALAS CARRERO   TECHNIQUE: Contiguous axial images of the chest, abdomen, and pelvis were obtained after the intravenous administration of 100 mL Omnipaque 350 contrast.  Coronal and sagittal reformatted images were reconstructed from the axial data.   Multiplanar reformatted images of the thoracic and lumbar spine were reconstructed from source data of concurrent CT chest/abdomen/pelvis acquisition.   FINDINGS: CT CHEST:   MEDIASTINUM AND LYMPH NODES:  The esophagus appears within normal limits.  No enlarged intrathoracic or axillary lymph nodes by imaging criteria. No pneumomediastinum.   VESSELS:  Normal caliber thoracic aorta. No evidence of traumatic aortic injury. No significant aortic atherosclerosis.   HEART: Cardiomegaly. The pulmonary vasculature is within normal limits.  Mild coronary artery calcifications. No significant pericardial effusion.   LUNG, AIRWAYS, PLEURA: There is mosaic perfusion mild left lower lobe atelectasis. Most likely on the basis of air trapping from distal airways disease. No consolidation, pulmonary edema, pleural effusion or pneumothorax.   CHEST WALL SOFT TISSUES: No discernible acute abnormality.   OSSEOUS STRUCTURES: There is an acute nondisplaced fracture of the posterior left 5th rib with mild extrapleural soft tissue swelling.     CT ABDOMEN/PELVIS:   ABDOMINAL WALL: No acute abnormality.   LIVER: No acute abnormality. No suspicious lesions.   BILE DUCTS: No significant intrahepatic or extrahepatic dilatation.   GALLBLADDER: No significant abnormality.   SPLEEN: No significant abnormality.   PANCREAS: No significant abnormality.   ADRENALS: No significant abnormality.   KIDNEYS, URETERS, BLADDER: No significant abnormality.   REPRODUCTIVE ORGANS: The prostate  gland is enlarged, measuring 5.7 cm in transverse dimension.   VESSELS: No acute vascular injury. Mild aortic atherosclerosis without AAA.   LYMPH NODES/RETROPERITONEUM: No acute retroperitoneal abnormality. No enlarged lymph nodes.   BOWEL/MESENTERY/PERITONEUM:  No bowel wall thickening or dilatation. Normal appendix. No ascites, free air or fluid collection.   MUSCULOSKELETAL: No acute osseous abnormality. Loose body in the right hip joint. Mild bilateral hip osteoarthrosis.     CT THORACIC SPINE:   PARASPINAL SOFT TISSUES: No paravertebral fluid collection or significant edema. ALIGNMENT:  No traumatic spondylolisthesis or traumatic facet widening. VERTEBRAE: Minimal depressions of the upper T3 and T4 endplates that demonstrated chronic appearance. No acute fracture. SPINAL CANAL/INTERVERTEBRAL DISCS: No high-grade spinal canal stenosis. No significant disc height loss. Minor endplate spurring, scattered minimal disc spur complexes.     CT LUMBAR SPINE:   PARASPINAL SOFT TISSUES: No paravertebral fluid collection or significant edema. ALIGNMENT:  No traumatic spondylolisthesis or traumatic facet widening. VERTEBRAE:  No acute fracture.  Vertebral body heights are maintained. SPINAL CANAL/INTERVERTEBRAL DISCS:  Mild diffuse disc bulges at L1-2 through L4-5, severe L5-S1 disc height loss with small broad-based disc spur complex. No significant canal stenosis. NEURAL FORAMINA: No significant neural foraminal stenosis.       CT CHEST/ABDOMEN/PELVIS: 1. Acute nondisplaced fracture of the posterior left 5th rib. 2. No other acute traumatic injury in the chest, abdomen, or pelvis. 3. Enlarged prostate gland.   CT THORACIC AND LUMBAR SPINE: 1. No acute fracture or traumatic malalignment. 2. Chronic appearing minimally depressed compression deformities of the superior T3 and T4 endplates.   MACRO: None.   Signed by: Axel Barone 2/24/2024 9:54 PM Dictation workstation:   [id number]    CT chest abdomen pelvis w IV  contrast    Result Date: 2/24/2024  Interpreted By:  Axel Barone, STUDY: CT CHEST ABDOMEN PELVIS W IV CONTRAST; CT THORACIC SPINE WO IV CONTRAST; CT LUMBAR SPINE WO IV CONTRAST;  2/24/2024 9:35 pm   INDICATION: Signs/Symptoms:Trauma.   COMPARISON: None.   ACCESSION NUMBER(S): [id number]; [id number]; [id number]   ORDERING CLINICIAN: SALAS CARRERO   TECHNIQUE: Contiguous axial images of the chest, abdomen, and pelvis were obtained after the intravenous administration of 100 mL Omnipaque 350 contrast.  Coronal and sagittal reformatted images were reconstructed from the axial data.   Multiplanar reformatted images of the thoracic and lumbar spine were reconstructed from source data of concurrent CT chest/abdomen/pelvis acquisition.   FINDINGS: CT CHEST:   MEDIASTINUM AND LYMPH NODES:  The esophagus appears within normal limits.  No enlarged intrathoracic or axillary lymph nodes by imaging criteria. No pneumomediastinum.   VESSELS:  Normal caliber thoracic aorta. No evidence of traumatic aortic injury. No significant aortic atherosclerosis.   HEART: Cardiomegaly. The pulmonary vasculature is within normal limits.  Mild coronary artery calcifications. No significant pericardial effusion.   LUNG, AIRWAYS, PLEURA: There is mosaic perfusion mild left lower lobe atelectasis. Most likely on the basis of air trapping from distal airways disease. No consolidation, pulmonary edema, pleural effusion or pneumothorax.   CHEST WALL SOFT TISSUES: No discernible acute abnormality.   OSSEOUS STRUCTURES: There is an acute nondisplaced fracture of the posterior left 5th rib with mild extrapleural soft tissue swelling.     CT ABDOMEN/PELVIS:   ABDOMINAL WALL: No acute abnormality.   LIVER: No acute abnormality. No suspicious lesions.   BILE DUCTS: No significant intrahepatic or extrahepatic dilatation.   GALLBLADDER: No significant abnormality.   SPLEEN: No significant abnormality.   PANCREAS: No significant abnormality.    ADRENALS: No significant abnormality.   KIDNEYS, URETERS, BLADDER: No significant abnormality.   REPRODUCTIVE ORGANS: The prostate gland is enlarged, measuring 5.7 cm in transverse dimension.   VESSELS: No acute vascular injury. Mild aortic atherosclerosis without AAA.   LYMPH NODES/RETROPERITONEUM: No acute retroperitoneal abnormality. No enlarged lymph nodes.   BOWEL/MESENTERY/PERITONEUM:  No bowel wall thickening or dilatation. Normal appendix. No ascites, free air or fluid collection.   MUSCULOSKELETAL: No acute osseous abnormality. Loose body in the right hip joint. Mild bilateral hip osteoarthrosis.     CT THORACIC SPINE:   PARASPINAL SOFT TISSUES: No paravertebral fluid collection or significant edema. ALIGNMENT:  No traumatic spondylolisthesis or traumatic facet widening. VERTEBRAE: Minimal depressions of the upper T3 and T4 endplates that demonstrated chronic appearance. No acute fracture. SPINAL CANAL/INTERVERTEBRAL DISCS: No high-grade spinal canal stenosis. No significant disc height loss. Minor endplate spurring, scattered minimal disc spur complexes.     CT LUMBAR SPINE:   PARASPINAL SOFT TISSUES: No paravertebral fluid collection or significant edema. ALIGNMENT:  No traumatic spondylolisthesis or traumatic facet widening. VERTEBRAE:  No acute fracture.  Vertebral body heights are maintained. SPINAL CANAL/INTERVERTEBRAL DISCS:  Mild diffuse disc bulges at L1-2 through L4-5, severe L5-S1 disc height loss with small broad-based disc spur complex. No significant canal stenosis. NEURAL FORAMINA: No significant neural foraminal stenosis.       CT CHEST/ABDOMEN/PELVIS: 1. Acute nondisplaced fracture of the posterior left 5th rib. 2. No other acute traumatic injury in the chest, abdomen, or pelvis. 3. Enlarged prostate gland.   CT THORACIC AND LUMBAR SPINE: 1. No acute fracture or traumatic malalignment. 2. Chronic appearing minimally depressed compression deformities of the superior T3 and T4 endplates.    MACRO: None.   Signed by: Axel Barone 2/24/2024 9:54 PM Dictation workstation:   [id number]    CT head W O contrast trauma protocol    Result Date: 2/24/2024  Interpreted By:  Axel Barone, STUDY: CT HEAD W/O CONTRAST TRAUMA PROTOCOL; CT CERVICAL SPINE WO IV CONTRAST;  2/24/2024 9:25 pm   INDICATION: Signs/Symptoms:Fall down many steps; Signs/Symptoms:Trauma   COMPARISON: None.   ACCESSION NUMBER(S): [id number]; [id number]   ORDERING CLINICIAN: SALAS CARRERO   TECHNIQUE: Axial noncontrast CT images of head with coronal and sagittal reconstructed images. Axial noncontrast CT images of the cervical spine with coronal and sagittal reconstructed images.   FINDINGS: CT HEAD:   BRAIN PARENCHYMA:  No acute intraparenchymal hemorrhage or parenchymal evidence of acute large territory ischemic infarct. No mass-effect. Gray-white matter distinction is preserved.   VENTRICLES and EXTRA-AXIAL SPACES: There is a focal hyperdense clot in the subarachnoid space along the right parietal lobe measuring 1.2 cm x 0.9 cm. No acute subdural or intraventricular hemorrhage. No effacement of cerebral sulci. Ventricles and sulci are age-concordant.   PARANASAL SINUSES/MASTOIDS:  No hemorrhage or air-fluid levels within the visualized paranasal sinuses. The mastoids are well aerated.   CALVARIUM/ORBITS:  No skull fracture.  The orbits and globes are intact to the extent visualized.   EXTRACRANIAL SOFT TISSUES: Large left parieto-occipital scalp hematoma and laceration. There is subgaleal component measuring 7.8 cm x 8.6 cm x 0.9 cm and a more superficial component measuring 4.6 cm x 1.5 cm x 4.4 cm.     Brain Injury Guidelines (BIG) CT Values:   Skull fracture: No SDH (subdural hematoma): No EDH (epidural hematoma): No IPH (intraparenchymal hemorrhage): No SAH (subarachnoid hemorrhage): Localized IVH (intraventricular hemorrhage): No   Reference: Elio REYNOLDS, Nata CARVER, Estrellita MAYA, et al. The BIG (brain injury guidelines)  project: defining the management of traumatic brain injury by acute care surgeons. J Trauma Acute Care Surg 2014; 76:965-969.   CT CERVICAL SPINE:   PREVERTEBRAL SOFT TISSUES: Within normal limits.   CRANIOCERVICAL JUNCTION: Intact.   ALIGNMENT:  No traumatic malalignment or traumatic facet widening.   VERTEBRAE:  No acute fracture. Vertebral body heights are maintained.   SPINAL CANAL/INTERVERTEBRAL DISCS: Multilevel degenerative disc disease with disc spur complexes and varying degrees of disc height loss, most advanced at C3-C4 and C5-C6 where there is severe disc height loss and disc spur complexes resulting in up to moderate canal stenosis at C3-C4.   NEURAL FORAMINA: Multilevel uncovertebral joint and facet arthropathy notably contribute to mild right and severe left C3-C4 foraminal stenosis, moderate-severe right and severe left C4-C5 foraminal stenosis, severe bilateral C5-C6 foraminal stenosis, mild bilateral C6-C7 foraminal stenosis.   OTHER: None.       CT HEAD: 1. No acute intracranial abnormality or calvarial fracture. 2. Focal subarachnoid hemorrhage overlying the right parietal lobe with o'clock measuring 1.2 cm x 0.9 cm. 3. Large left parietooccipital scalp hematoma and laceration with a subgaleal component as described above.   CT CERVICAL SPINE: 1. No acute fracture or traumatic malalignment of the cervical spine. 2. Spondylotic changes of the cervical spine as detailed above.   MACRO: Axel Barone discussed the significance and urgency of this critical finding by EPIC HAIKU with  SALAS CARRERO on 2/24/2024 at 9:40 pm.  (**-RCF-**) Findings:  See findings.   Signed by: Axel Barone 2/24/2024 9:41 PM Dictation workstation:   [id number]    CT cervical spine wo IV contrast    Result Date: 2/24/2024  Interpreted By:  Axel Barone, STUDY: CT HEAD W/O CONTRAST TRAUMA PROTOCOL; CT CERVICAL SPINE WO IV CONTRAST;  2/24/2024 9:25 pm   INDICATION: Signs/Symptoms:Fall down many steps;  Signs/Symptoms:Trauma   COMPARISON: None.   ACCESSION NUMBER(S): [id number]; [id number]   ORDERING CLINICIAN: SALAS CARRERO   TECHNIQUE: Axial noncontrast CT images of head with coronal and sagittal reconstructed images. Axial noncontrast CT images of the cervical spine with coronal and sagittal reconstructed images.   FINDINGS: CT HEAD:   BRAIN PARENCHYMA:  No acute intraparenchymal hemorrhage or parenchymal evidence of acute large territory ischemic infarct. No mass-effect. Gray-white matter distinction is preserved.   VENTRICLES and EXTRA-AXIAL SPACES: There is a focal hyperdense clot in the subarachnoid space along the right parietal lobe measuring 1.2 cm x 0.9 cm. No acute subdural or intraventricular hemorrhage. No effacement of cerebral sulci. Ventricles and sulci are age-concordant.   PARANASAL SINUSES/MASTOIDS:  No hemorrhage or air-fluid levels within the visualized paranasal sinuses. The mastoids are well aerated.   CALVARIUM/ORBITS:  No skull fracture.  The orbits and globes are intact to the extent visualized.   EXTRACRANIAL SOFT TISSUES: Large left parieto-occipital scalp hematoma and laceration. There is subgaleal component measuring 7.8 cm x 8.6 cm x 0.9 cm and a more superficial component measuring 4.6 cm x 1.5 cm x 4.4 cm.     Brain Injury Guidelines (BIG) CT Values:   Skull fracture: No SDH (subdural hematoma): No EDH (epidural hematoma): No IPH (intraparenchymal hemorrhage): No SAH (subarachnoid hemorrhage): Localized IVH (intraventricular hemorrhage): No   Reference: lEio B, Nata RS, Estrellita M, et al. The BIG (brain injury guidelines) project: defining the management of traumatic brain injury by acute care surgeons. J Trauma Acute Care Surg 2014; 76:965-969.   CT CERVICAL SPINE:   PREVERTEBRAL SOFT TISSUES: Within normal limits.   CRANIOCERVICAL JUNCTION: Intact.   ALIGNMENT:  No traumatic malalignment or traumatic facet widening.   VERTEBRAE:  No acute fracture. Vertebral body  heights are maintained.   SPINAL CANAL/INTERVERTEBRAL DISCS: Multilevel degenerative disc disease with disc spur complexes and varying degrees of disc height loss, most advanced at C3-C4 and C5-C6 where there is severe disc height loss and disc spur complexes resulting in up to moderate canal stenosis at C3-C4.   NEURAL FORAMINA: Multilevel uncovertebral joint and facet arthropathy notably contribute to mild right and severe left C3-C4 foraminal stenosis, moderate-severe right and severe left C4-C5 foraminal stenosis, severe bilateral C5-C6 foraminal stenosis, mild bilateral C6-C7 foraminal stenosis.   OTHER: None.       CT HEAD: 1. No acute intracranial abnormality or calvarial fracture. 2. Focal subarachnoid hemorrhage overlying the right parietal lobe with o'clock measuring 1.2 cm x 0.9 cm. 3. Large left parietooccipital scalp hematoma and laceration with a subgaleal component as described above.   CT CERVICAL SPINE: 1. No acute fracture or traumatic malalignment of the cervical spine. 2. Spondylotic changes of the cervical spine as detailed above.   MACRO: Axel Barone discussed the significance and urgency of this critical finding by EPIC HAIKU with  SALAS CARRERO on 2/24/2024 at 9:40 pm.  (**-RCF-**) Findings:  See findings.   Signed by: Axel Barone 2/24/2024 9:41 PM Dictation workstation:   [id number]      Pertinent Physical Exam At Time of Discharge  Physical Exam    Home Medications     Medication List      ASK your doctor about these medications     lisinopril 10 mg tablet; Take 1 tablet (10 mg) by mouth once daily.   milk thistle 175 mg tablet       Outpatient Follow-Up  Future Appointments   Date Time Provider Department Center   5/9/2024  3:20 PM Randal Balderrama DO [id number] Farrukh Bergman MD

## 2024-02-25 NOTE — CARE PLAN
Problem: Pain  Goal: My pain/discomfort is manageable  Outcome: Progressing     Problem: Safety  Goal: Patient will be injury free during hospitalization  Outcome: Progressing  Goal: I will remain free of falls  Outcome: Progressing     Problem: Daily Care  Goal: Daily care needs are met  Outcome: Progressing     Problem: Psychosocial Needs  Goal: Demonstrates ability to cope with hospitalization/illness  Outcome: Progressing  Goal: Collaborate with me, my family, and caregiver to identify my specific goals  Outcome: Progressing  Flowsheets (Taken 2/25/2024 0159)  Cultural Requests During Hospitalization: none  Spiritual Requests During Hospitalization: none     Problem: Discharge Barriers  Goal: My discharge needs are met  Outcome: Progressing     Problem: Fall/Injury  Goal: Not fall by end of shift  Outcome: Progressing  Goal: Be free from injury by end of the shift  Outcome: Progressing  Goal: Verbalize understanding of personal risk factors for fall in the hospital  Outcome: Progressing  Goal: Verbalize understanding of risk factor reduction measures to prevent injury from fall in the home  Outcome: Progressing  Goal: Use assistive devices by end of the shift  Outcome: Progressing  Goal: Pace activities to prevent fatigue by end of the shift  Outcome: Progressing   The patient's goals for the shift include remain pain free overnight    The clinical goals for the shift include no change in neurological status overnight    Over the shift, the patient did make progress toward the following goals. Barriers to progression include acuteness of injury and alcohol intoxication. Recommendations to address these barriers include providing supportive care and restful enviornment.

## 2024-02-25 NOTE — H&P
History Of Present Illness  Jeramy Reynoso is a 73 y.o. male presenting after a fall.  Approximately 8 PM today, his wife heard a loud noise from a different room.  He was found on the ground bleeding from the back of his head down several stairs on a concrete floor.  He was reportedly confused at the time.  Unknown loss of consciousness.  Patient reportedly had 3 alcoholic drinks this evening and this is standard for him on a daily basis.  Patient's mental status gradually improved throughout his time in the emergency department.  He is not on blood thinners.  Patient was sent to CT where a small subarachnoid hemorrhage was found within his brain.  Neurosurgery recommended frequent neurochecks in the ICU with repeat head CT in 6 hours.  Furthermore, given patient's mental status at the time of presentation, patient was pan scanned.  This demonstrated acute nondisplaced fractures of the posterior left fifth rib but was otherwise unremarkable.  Hematology notable for minor anemia of 13.  Chemistry notable for minor hyponatremia of 133.  Alcohol notably elevated at 249.  Remainder of his workup is otherwise unremarkable.  At the time of our interview, patient was alert and oriented x 3 with an intact neurologic exam.  He denied any other symptoms at this time.     Past Medical History  - Essential hypertension    Surgical History  Past Surgical History:   Procedure Laterality Date    OTHER SURGICAL HISTORY  11/08/2022    Tonsillectomy        Social History  He reports that he has quit smoking. His smoking use included cigarettes. He has never used smokeless tobacco. He reports current alcohol use. He reports that he does not use drugs.    Family History  Family History   Family history unknown: Yes        Allergies  Patient has no known allergies.    Review of Systems  General: Denies fever, chills  Neuro: Denies headache, change in vision, weakness  HEENT: denies change in vision, hearing, epistaxis  CV: Denies  chest pain, palpitations.  Pulm: Denies sob, cough.  GI: Denies nausea, vomiting, AP.  : Denies decreased urination, hematuria.  MSK: Denies msk pain, deformity.  Skin: Denies rash, reports head lac.    Objective   Vitals:  Temp  Min: 35.8 °C (96.4 °F)  Max: 36.2 °C (97.2 °F)  Pulse  Min: 99  Max: 117  BP  Min: 180/85  Max: 188/100  Resp  Min: 16  Max: 22  SpO2  Min: 99 %  Max: 100 %    Physical Exam:   General: no acute distress, well-developed  Skin: Warm, dry.  HEENT: 1 cm superficial left posterior scalp laceration without active bleeding.  Nasal septum midline.  No nasal septal hematoma.  No hemotympanum.  No oral lesions.  No Levy sign.  Trachea midline  CV: tachycardic, regular rhythm, normal heart sounds, no lower extremity edema  Pulm: Clear to auscultation bilaterally, no abnormal breath sounds  GI: Soft, non-tender, non-distended  MSK: No clear deformities or injuries  Neurology: A&O x3, moving all extremities spontaneously, no ataxia, dysarthria, aphasia.  No sensorimotor deficits.  CN II through XII grossly intact.    Labs:     Results for orders placed or performed during the hospital encounter of 02/24/24 (from the past 24 hour(s))   Comprehensive Metabolic Panel   Result Value Ref Range    Glucose 119 (H) 74 - 99 mg/dL    Sodium 133 (L) 136 - 145 mmol/L    Potassium 4.1 3.5 - 5.3 mmol/L    Chloride 98 98 - 107 mmol/L    Bicarbonate 25 21 - 32 mmol/L    Anion Gap 14 10 - 20 mmol/L    Urea Nitrogen 15 6 - 23 mg/dL    Creatinine 1.11 0.50 - 1.30 mg/dL    eGFR 70 >60 mL/min/1.73m*2    Calcium 8.8 8.6 - 10.3 mg/dL    Albumin 4.3 3.4 - 5.0 g/dL    Alkaline Phosphatase 64 33 - 136 U/L    Total Protein 7.1 6.4 - 8.2 g/dL    AST 36 9 - 39 U/L    Bilirubin, Total 0.8 0.0 - 1.2 mg/dL    ALT 29 10 - 52 U/L   Lactate   Result Value Ref Range    Lactate 1.4 0.4 - 2.0 mmol/L   Protime-INR   Result Value Ref Range    Protime 23.1 (H) 9.8 - 12.8 seconds    INR 2.0 (H) 0.9 - 1.1   Type And Screen   Result Value  Ref Range    ABO TYPE A     Rh TYPE POS     ANTIBODY SCREEN NEG    Acute Toxicology Panel, Blood   Result Value Ref Range    Acetaminophen <10.0 10.0 - 30.0 ug/mL    Salicylate  <3 4 - 20 mg/dL    Alcohol 249 (H) <=10 mg/dL   CBC and Auto Differential   Result Value Ref Range    WBC 7.0 4.4 - 11.3 x10*3/uL    nRBC 0.0 0.0 - 0.0 /100 WBCs    RBC 3.84 (L) 4.50 - 5.90 x10*6/uL    Hemoglobin 13.4 (L) 13.5 - 17.5 g/dL    Hematocrit 38.4 (L) 41.0 - 52.0 %     80 - 100 fL    MCH 34.9 (H) 26.0 - 34.0 pg    MCHC 34.9 32.0 - 36.0 g/dL    RDW 12.9 11.5 - 14.5 %    Platelets 109 (L) 150 - 450 x10*3/uL    Neutrophils % 76.5 40.0 - 80.0 %    Immature Granulocytes %, Automated 0.4 0.0 - 0.9 %    Lymphocytes % 16.8 13.0 - 44.0 %    Monocytes % 5.3 2.0 - 10.0 %    Eosinophils % 0.7 0.0 - 6.0 %    Basophils % 0.3 0.0 - 2.0 %    Neutrophils Absolute 5.38 1.60 - 5.50 x10*3/uL    Immature Granulocytes Absolute, Automated 0.03 0.00 - 0.50 x10*3/uL    Lymphocytes Absolute 1.18 0.80 - 3.00 x10*3/uL    Monocytes Absolute 0.37 0.05 - 0.80 x10*3/uL    Eosinophils Absolute 0.05 0.00 - 0.40 x10*3/uL    Basophils Absolute 0.02 0.00 - 0.10 x10*3/uL        Imaging:   CT thoracic spine wo IV contrast    Result Date: 2/24/2024  Interpreted By:  Axel Barone, STUDY: CT CHEST ABDOMEN PELVIS W IV CONTRAST; CT THORACIC SPINE WO IV CONTRAST; CT LUMBAR SPINE WO IV CONTRAST;  2/24/2024 9:35 pm   INDICATION: Signs/Symptoms:Trauma.   COMPARISON: None.   ACCESSION NUMBER(S): [id number]; [id number]; [id number]   ORDERING CLINICIAN: SALAS CARRERO   TECHNIQUE: Contiguous axial images of the chest, abdomen, and pelvis were obtained after the intravenous administration of 100 mL Omnipaque 350 contrast.  Coronal and sagittal reformatted images were reconstructed from the axial data.   Multiplanar reformatted images of the thoracic and lumbar spine were reconstructed from source data of concurrent CT chest/abdomen/pelvis acquisition.    FINDINGS: CT CHEST:   MEDIASTINUM AND LYMPH NODES:  The esophagus appears within normal limits.  No enlarged intrathoracic or axillary lymph nodes by imaging criteria. No pneumomediastinum.   VESSELS:  Normal caliber thoracic aorta. No evidence of traumatic aortic injury. No significant aortic atherosclerosis.   HEART: Cardiomegaly. The pulmonary vasculature is within normal limits.  Mild coronary artery calcifications. No significant pericardial effusion.   LUNG, AIRWAYS, PLEURA: There is mosaic perfusion mild left lower lobe atelectasis. Most likely on the basis of air trapping from distal airways disease. No consolidation, pulmonary edema, pleural effusion or pneumothorax.   CHEST WALL SOFT TISSUES: No discernible acute abnormality.   OSSEOUS STRUCTURES: There is an acute nondisplaced fracture of the posterior left 5th rib with mild extrapleural soft tissue swelling.     CT ABDOMEN/PELVIS:   ABDOMINAL WALL: No acute abnormality.   LIVER: No acute abnormality. No suspicious lesions.   BILE DUCTS: No significant intrahepatic or extrahepatic dilatation.   GALLBLADDER: No significant abnormality.   SPLEEN: No significant abnormality.   PANCREAS: No significant abnormality.   ADRENALS: No significant abnormality.   KIDNEYS, URETERS, BLADDER: No significant abnormality.   REPRODUCTIVE ORGANS: The prostate gland is enlarged, measuring 5.7 cm in transverse dimension.   VESSELS: No acute vascular injury. Mild aortic atherosclerosis without AAA.   LYMPH NODES/RETROPERITONEUM: No acute retroperitoneal abnormality. No enlarged lymph nodes.   BOWEL/MESENTERY/PERITONEUM:  No bowel wall thickening or dilatation. Normal appendix. No ascites, free air or fluid collection.   MUSCULOSKELETAL: No acute osseous abnormality. Loose body in the right hip joint. Mild bilateral hip osteoarthrosis.     CT THORACIC SPINE:   PARASPINAL SOFT TISSUES: No paravertebral fluid collection or significant edema. ALIGNMENT:  No traumatic  spondylolisthesis or traumatic facet widening. VERTEBRAE: Minimal depressions of the upper T3 and T4 endplates that demonstrated chronic appearance. No acute fracture. SPINAL CANAL/INTERVERTEBRAL DISCS: No high-grade spinal canal stenosis. No significant disc height loss. Minor endplate spurring, scattered minimal disc spur complexes.     CT LUMBAR SPINE:   PARASPINAL SOFT TISSUES: No paravertebral fluid collection or significant edema. ALIGNMENT:  No traumatic spondylolisthesis or traumatic facet widening. VERTEBRAE:  No acute fracture.  Vertebral body heights are maintained. SPINAL CANAL/INTERVERTEBRAL DISCS:  Mild diffuse disc bulges at L1-2 through L4-5, severe L5-S1 disc height loss with small broad-based disc spur complex. No significant canal stenosis. NEURAL FORAMINA: No significant neural foraminal stenosis.       CT CHEST/ABDOMEN/PELVIS: 1. Acute nondisplaced fracture of the posterior left 5th rib. 2. No other acute traumatic injury in the chest, abdomen, or pelvis. 3. Enlarged prostate gland.   CT THORACIC AND LUMBAR SPINE: 1. No acute fracture or traumatic malalignment. 2. Chronic appearing minimally depressed compression deformities of the superior T3 and T4 endplates.   MACRO: None.   Signed by: Axel Barone 2/24/2024 9:54 PM Dictation workstation:   [id number]    CT lumbar spine wo IV contrast    Result Date: 2/24/2024  Interpreted By:  Axel Barone, STUDY: CT CHEST ABDOMEN PELVIS W IV CONTRAST; CT THORACIC SPINE WO IV CONTRAST; CT LUMBAR SPINE WO IV CONTRAST;  2/24/2024 9:35 pm   INDICATION: Signs/Symptoms:Trauma.   COMPARISON: None.   ACCESSION NUMBER(S): [id number]; [id number]; [id number]   ORDERING CLINICIAN: SALAS CARRERO   TECHNIQUE: Contiguous axial images of the chest, abdomen, and pelvis were obtained after the intravenous administration of 100 mL Omnipaque 350 contrast.  Coronal and sagittal reformatted images were reconstructed from the axial data.   Multiplanar  reformatted images of the thoracic and lumbar spine were reconstructed from source data of concurrent CT chest/abdomen/pelvis acquisition.   FINDINGS: CT CHEST:   MEDIASTINUM AND LYMPH NODES:  The esophagus appears within normal limits.  No enlarged intrathoracic or axillary lymph nodes by imaging criteria. No pneumomediastinum.   VESSELS:  Normal caliber thoracic aorta. No evidence of traumatic aortic injury. No significant aortic atherosclerosis.   HEART: Cardiomegaly. The pulmonary vasculature is within normal limits.  Mild coronary artery calcifications. No significant pericardial effusion.   LUNG, AIRWAYS, PLEURA: There is mosaic perfusion mild left lower lobe atelectasis. Most likely on the basis of air trapping from distal airways disease. No consolidation, pulmonary edema, pleural effusion or pneumothorax.   CHEST WALL SOFT TISSUES: No discernible acute abnormality.   OSSEOUS STRUCTURES: There is an acute nondisplaced fracture of the posterior left 5th rib with mild extrapleural soft tissue swelling.     CT ABDOMEN/PELVIS:   ABDOMINAL WALL: No acute abnormality.   LIVER: No acute abnormality. No suspicious lesions.   BILE DUCTS: No significant intrahepatic or extrahepatic dilatation.   GALLBLADDER: No significant abnormality.   SPLEEN: No significant abnormality.   PANCREAS: No significant abnormality.   ADRENALS: No significant abnormality.   KIDNEYS, URETERS, BLADDER: No significant abnormality.   REPRODUCTIVE ORGANS: The prostate gland is enlarged, measuring 5.7 cm in transverse dimension.   VESSELS: No acute vascular injury. Mild aortic atherosclerosis without AAA.   LYMPH NODES/RETROPERITONEUM: No acute retroperitoneal abnormality. No enlarged lymph nodes.   BOWEL/MESENTERY/PERITONEUM:  No bowel wall thickening or dilatation. Normal appendix. No ascites, free air or fluid collection.   MUSCULOSKELETAL: No acute osseous abnormality. Loose body in the right hip joint. Mild bilateral hip osteoarthrosis.      CT THORACIC SPINE:   PARASPINAL SOFT TISSUES: No paravertebral fluid collection or significant edema. ALIGNMENT:  No traumatic spondylolisthesis or traumatic facet widening. VERTEBRAE: Minimal depressions of the upper T3 and T4 endplates that demonstrated chronic appearance. No acute fracture. SPINAL CANAL/INTERVERTEBRAL DISCS: No high-grade spinal canal stenosis. No significant disc height loss. Minor endplate spurring, scattered minimal disc spur complexes.     CT LUMBAR SPINE:   PARASPINAL SOFT TISSUES: No paravertebral fluid collection or significant edema. ALIGNMENT:  No traumatic spondylolisthesis or traumatic facet widening. VERTEBRAE:  No acute fracture.  Vertebral body heights are maintained. SPINAL CANAL/INTERVERTEBRAL DISCS:  Mild diffuse disc bulges at L1-2 through L4-5, severe L5-S1 disc height loss with small broad-based disc spur complex. No significant canal stenosis. NEURAL FORAMINA: No significant neural foraminal stenosis.       CT CHEST/ABDOMEN/PELVIS: 1. Acute nondisplaced fracture of the posterior left 5th rib. 2. No other acute traumatic injury in the chest, abdomen, or pelvis. 3. Enlarged prostate gland.   CT THORACIC AND LUMBAR SPINE: 1. No acute fracture or traumatic malalignment. 2. Chronic appearing minimally depressed compression deformities of the superior T3 and T4 endplates.   MACRO: None.   Signed by: Axel Barone 2/24/2024 9:54 PM Dictation workstation:   [id number]    CT chest abdomen pelvis w IV contrast    Result Date: 2/24/2024  Interpreted By:  Axel Barone, STUDY: CT CHEST ABDOMEN PELVIS W IV CONTRAST; CT THORACIC SPINE WO IV CONTRAST; CT LUMBAR SPINE WO IV CONTRAST;  2/24/2024 9:35 pm   INDICATION: Signs/Symptoms:Trauma.   COMPARISON: None.   ACCESSION NUMBER(S): [id number]; [id number]; [id number]   ORDERING CLINICIAN: SALAS CARRERO   TECHNIQUE: Contiguous axial images of the chest, abdomen, and pelvis were obtained after the intravenous administration  of 100 mL Omnipaque 350 contrast.  Coronal and sagittal reformatted images were reconstructed from the axial data.   Multiplanar reformatted images of the thoracic and lumbar spine were reconstructed from source data of concurrent CT chest/abdomen/pelvis acquisition.   FINDINGS: CT CHEST:   MEDIASTINUM AND LYMPH NODES:  The esophagus appears within normal limits.  No enlarged intrathoracic or axillary lymph nodes by imaging criteria. No pneumomediastinum.   VESSELS:  Normal caliber thoracic aorta. No evidence of traumatic aortic injury. No significant aortic atherosclerosis.   HEART: Cardiomegaly. The pulmonary vasculature is within normal limits.  Mild coronary artery calcifications. No significant pericardial effusion.   LUNG, AIRWAYS, PLEURA: There is mosaic perfusion mild left lower lobe atelectasis. Most likely on the basis of air trapping from distal airways disease. No consolidation, pulmonary edema, pleural effusion or pneumothorax.   CHEST WALL SOFT TISSUES: No discernible acute abnormality.   OSSEOUS STRUCTURES: There is an acute nondisplaced fracture of the posterior left 5th rib with mild extrapleural soft tissue swelling.     CT ABDOMEN/PELVIS:   ABDOMINAL WALL: No acute abnormality.   LIVER: No acute abnormality. No suspicious lesions.   BILE DUCTS: No significant intrahepatic or extrahepatic dilatation.   GALLBLADDER: No significant abnormality.   SPLEEN: No significant abnormality.   PANCREAS: No significant abnormality.   ADRENALS: No significant abnormality.   KIDNEYS, URETERS, BLADDER: No significant abnormality.   REPRODUCTIVE ORGANS: The prostate gland is enlarged, measuring 5.7 cm in transverse dimension.   VESSELS: No acute vascular injury. Mild aortic atherosclerosis without AAA.   LYMPH NODES/RETROPERITONEUM: No acute retroperitoneal abnormality. No enlarged lymph nodes.   BOWEL/MESENTERY/PERITONEUM:  No bowel wall thickening or dilatation. Normal appendix. No ascites, free air or fluid  collection.   MUSCULOSKELETAL: No acute osseous abnormality. Loose body in the right hip joint. Mild bilateral hip osteoarthrosis.     CT THORACIC SPINE:   PARASPINAL SOFT TISSUES: No paravertebral fluid collection or significant edema. ALIGNMENT:  No traumatic spondylolisthesis or traumatic facet widening. VERTEBRAE: Minimal depressions of the upper T3 and T4 endplates that demonstrated chronic appearance. No acute fracture. SPINAL CANAL/INTERVERTEBRAL DISCS: No high-grade spinal canal stenosis. No significant disc height loss. Minor endplate spurring, scattered minimal disc spur complexes.     CT LUMBAR SPINE:   PARASPINAL SOFT TISSUES: No paravertebral fluid collection or significant edema. ALIGNMENT:  No traumatic spondylolisthesis or traumatic facet widening. VERTEBRAE:  No acute fracture.  Vertebral body heights are maintained. SPINAL CANAL/INTERVERTEBRAL DISCS:  Mild diffuse disc bulges at L1-2 through L4-5, severe L5-S1 disc height loss with small broad-based disc spur complex. No significant canal stenosis. NEURAL FORAMINA: No significant neural foraminal stenosis.       CT CHEST/ABDOMEN/PELVIS: 1. Acute nondisplaced fracture of the posterior left 5th rib. 2. No other acute traumatic injury in the chest, abdomen, or pelvis. 3. Enlarged prostate gland.   CT THORACIC AND LUMBAR SPINE: 1. No acute fracture or traumatic malalignment. 2. Chronic appearing minimally depressed compression deformities of the superior T3 and T4 endplates.   MACRO: None.   Signed by: Axel Barone 2/24/2024 9:54 PM Dictation workstation:   [id number]    CT head W O contrast trauma protocol    Result Date: 2/24/2024  Interpreted By:  Axel Barone, STUDY: CT HEAD W/O CONTRAST TRAUMA PROTOCOL; CT CERVICAL SPINE WO IV CONTRAST;  2/24/2024 9:25 pm   INDICATION: Signs/Symptoms:Fall down many steps; Signs/Symptoms:Trauma   COMPARISON: None.   ACCESSION NUMBER(S): [id number]; [id number]   ORDERING CLINICIAN: SALAS CARRERO    TECHNIQUE: Axial noncontrast CT images of head with coronal and sagittal reconstructed images. Axial noncontrast CT images of the cervical spine with coronal and sagittal reconstructed images.   FINDINGS: CT HEAD:   BRAIN PARENCHYMA:  No acute intraparenchymal hemorrhage or parenchymal evidence of acute large territory ischemic infarct. No mass-effect. Gray-white matter distinction is preserved.   VENTRICLES and EXTRA-AXIAL SPACES: There is a focal hyperdense clot in the subarachnoid space along the right parietal lobe measuring 1.2 cm x 0.9 cm. No acute subdural or intraventricular hemorrhage. No effacement of cerebral sulci. Ventricles and sulci are age-concordant.   PARANASAL SINUSES/MASTOIDS:  No hemorrhage or air-fluid levels within the visualized paranasal sinuses. The mastoids are well aerated.   CALVARIUM/ORBITS:  No skull fracture.  The orbits and globes are intact to the extent visualized.   EXTRACRANIAL SOFT TISSUES: Large left parieto-occipital scalp hematoma and laceration. There is subgaleal component measuring 7.8 cm x 8.6 cm x 0.9 cm and a more superficial component measuring 4.6 cm x 1.5 cm x 4.4 cm.     Brain Injury Guidelines (BIG) CT Values:   Skull fracture: No SDH (subdural hematoma): No EDH (epidural hematoma): No IPH (intraparenchymal hemorrhage): No SAH (subarachnoid hemorrhage): Localized IVH (intraventricular hemorrhage): No   Reference: Elio REYNOLDS, Nata RS, Estrellita M, et al. The BIG (brain injury guidelines) project: defining the management of traumatic brain injury by acute care surgeons. J Trauma Acute Care Surg 2014; 76:965-969.   CT CERVICAL SPINE:   PREVERTEBRAL SOFT TISSUES: Within normal limits.   CRANIOCERVICAL JUNCTION: Intact.   ALIGNMENT:  No traumatic malalignment or traumatic facet widening.   VERTEBRAE:  No acute fracture. Vertebral body heights are maintained.   SPINAL CANAL/INTERVERTEBRAL DISCS: Multilevel degenerative disc disease with disc spur complexes and varying  degrees of disc height loss, most advanced at C3-C4 and C5-C6 where there is severe disc height loss and disc spur complexes resulting in up to moderate canal stenosis at C3-C4.   NEURAL FORAMINA: Multilevel uncovertebral joint and facet arthropathy notably contribute to mild right and severe left C3-C4 foraminal stenosis, moderate-severe right and severe left C4-C5 foraminal stenosis, severe bilateral C5-C6 foraminal stenosis, mild bilateral C6-C7 foraminal stenosis.   OTHER: None.       CT HEAD: 1. No acute intracranial abnormality or calvarial fracture. 2. Focal subarachnoid hemorrhage overlying the right parietal lobe with o'clock measuring 1.2 cm x 0.9 cm. 3. Large left parietooccipital scalp hematoma and laceration with a subgaleal component as described above.   CT CERVICAL SPINE: 1. No acute fracture or traumatic malalignment of the cervical spine. 2. Spondylotic changes of the cervical spine as detailed above.   MACRO: Axel Barone discussed the significance and urgency of this critical finding by EPIC HAIKU with  SALAS CARRERO on 2/24/2024 at 9:40 pm.  (**-RCF-**) Findings:  See findings.   Signed by: Axel Barone 2/24/2024 9:41 PM Dictation workstation:   [id number]    CT cervical spine wo IV contrast    Result Date: 2/24/2024  Interpreted By:  Axel Barone, STUDY: CT HEAD W/O CONTRAST TRAUMA PROTOCOL; CT CERVICAL SPINE WO IV CONTRAST;  2/24/2024 9:25 pm   INDICATION: Signs/Symptoms:Fall down many steps; Signs/Symptoms:Trauma   COMPARISON: None.   ACCESSION NUMBER(S): [id number]; [id number]   ORDERING CLINICIAN: SALAS CARRERO   TECHNIQUE: Axial noncontrast CT images of head with coronal and sagittal reconstructed images. Axial noncontrast CT images of the cervical spine with coronal and sagittal reconstructed images.   FINDINGS: CT HEAD:   BRAIN PARENCHYMA:  No acute intraparenchymal hemorrhage or parenchymal evidence of acute large territory ischemic infarct. No mass-effect.  Gray-white matter distinction is preserved.   VENTRICLES and EXTRA-AXIAL SPACES: There is a focal hyperdense clot in the subarachnoid space along the right parietal lobe measuring 1.2 cm x 0.9 cm. No acute subdural or intraventricular hemorrhage. No effacement of cerebral sulci. Ventricles and sulci are age-concordant.   PARANASAL SINUSES/MASTOIDS:  No hemorrhage or air-fluid levels within the visualized paranasal sinuses. The mastoids are well aerated.   CALVARIUM/ORBITS:  No skull fracture.  The orbits and globes are intact to the extent visualized.   EXTRACRANIAL SOFT TISSUES: Large left parieto-occipital scalp hematoma and laceration. There is subgaleal component measuring 7.8 cm x 8.6 cm x 0.9 cm and a more superficial component measuring 4.6 cm x 1.5 cm x 4.4 cm.     Brain Injury Guidelines (BIG) CT Values:   Skull fracture: No SDH (subdural hematoma): No EDH (epidural hematoma): No IPH (intraparenchymal hemorrhage): No SAH (subarachnoid hemorrhage): Localized IVH (intraventricular hemorrhage): No   Reference: Elio REYNOLDS, Nata RS, Estrellita M, et al. The BIG (brain injury guidelines) project: defining the management of traumatic brain injury by acute care surgeons. J Trauma Acute Care Surg 2014; 76:965-969.   CT CERVICAL SPINE:   PREVERTEBRAL SOFT TISSUES: Within normal limits.   CRANIOCERVICAL JUNCTION: Intact.   ALIGNMENT:  No traumatic malalignment or traumatic facet widening.   VERTEBRAE:  No acute fracture. Vertebral body heights are maintained.   SPINAL CANAL/INTERVERTEBRAL DISCS: Multilevel degenerative disc disease with disc spur complexes and varying degrees of disc height loss, most advanced at C3-C4 and C5-C6 where there is severe disc height loss and disc spur complexes resulting in up to moderate canal stenosis at C3-C4.   NEURAL FORAMINA: Multilevel uncovertebral joint and facet arthropathy notably contribute to mild right and severe left C3-C4 foraminal stenosis, moderate-severe right and severe  left C4-C5 foraminal stenosis, severe bilateral C5-C6 foraminal stenosis, mild bilateral C6-C7 foraminal stenosis.   OTHER: None.       CT HEAD: 1. No acute intracranial abnormality or calvarial fracture. 2. Focal subarachnoid hemorrhage overlying the right parietal lobe with o'clock measuring 1.2 cm x 0.9 cm. 3. Large left parietooccipital scalp hematoma and laceration with a subgaleal component as described above.   CT CERVICAL SPINE: 1. No acute fracture or traumatic malalignment of the cervical spine. 2. Spondylotic changes of the cervical spine as detailed above.   MACRO: Axel Barone discussed the significance and urgency of this critical finding by EPIC HAIKU with  SALAS CARRERO on 2/24/2024 at 9:40 pm.  (**-RCF-**) Findings:  See findings.   Signed by: Axel Barone 2/24/2024 9:41 PM Dictation workstation:   [id number]       Assessment/Plan     Neuro:  #Subarachnoid hemorrhage  #Hx alcohol abuse  -Patient neurologically intact at the time of interview by ICU  -Neurosurgery consulted; will follow up on recommendations  -q1h neuro checks  -Repeat head CT in 6 hours  -Patient without history of alcohol withdrawal.  That being said, patient's alcohol level of 249 is not consistent with his report of only 3 standard size beverages.  We will continue to monitor for possible need to implement CIWA protocols.  This may be unnecessary given possibility of short term stay and proximity to his last drink.  Last known drink was at 7:30 PM this evening.    Cardiac:  #Hypertension  -Maintain sBP < 160 mmHg in the setting of SAH  -Continue home lisinopril in the AM if no contraindications present  -Labetalol 10 mg PRN for sBP > 160 mmHg  -Continuous cardiac monitoring    Pulmonary:  #Rib fracture  -Patient maintaining oxygen saturation on room air  -Patient denies any pain at this time; will reassess as patient becomes sober and determine need for pain medication at that time.  -No further recommendations on  this regard from trauma  -Continuous pulse oximetry    Gastrointestinal:  -No acute issues to manage at this time  -Diet: N.p.o. until repeat head CT    Renal:  -Mild hyponatremia and asymptomatic.  No current issues to be addressed at this time.  -Will repeat renal function and electrolytes in the morning  Net IO Since Admission: No IO data has been entered for this period [[id number]]    Hematology:  #Anemia, unspecified  -Patient has no documented history of anemia.  No obvious indication of hemorrhage at this time radiographically or clinically.  Repeat CBC in the a.m.  - DVT Prophylaxis: SCDs. Holding pharmacologic anticoagulation in the setting of SAH.    Infectious Disease:  -Patient given 1 dose of Ancef in the emergency department for trauma for his scalp laceration  Temp (24hrs), Av.9 °C (96.7 °F), Min:35.8 °C (96.4 °F), Max:36.2 °C (97.2 °F)     Endocrine:  -No known history of diabetes or hypothyroidism.    -BG < 180 goal    CODE STATUS: Full code    Disposition: ICU      Cornelio Richard MD

## 2024-02-25 NOTE — NURSING NOTE
Patient new admission from ED to MICU room 2129.  Patient transported by previous nurse Shena POSEY.  Bedside report received.  Patient in stable condition upon arrival to unit.  Patient A&O x4 with no noted neurological deficits noted.  See documented admission assessments to follow.  Patient wife Dory at bedside and both patient wife updated on plan of care.

## 2024-02-25 NOTE — DISCHARGE INSTRUCTIONS
Take medication as prescribed. Observe fall precautions at home. Come back to the hospital if you experience worsening headache, dizziness, eye pain, blurry vision, shortness of breath, chest pain, loss of balance, numbness, tingling, or weakness in your hands or legs.    Follow up with your primary care physician regarding your hospital stay.

## 2024-02-25 NOTE — CARE PLAN
The patient's goals for the shift include go home    The clinical goals for the shift include pt will remain HDS through end of shift    Over the shift, the patient did make progress toward the following goals.     Patient being discharged home in stable condition. Discharge instructions and medications reviewed with patient. PIVs removed. Patient has all belongings.

## 2024-02-25 NOTE — CONSULTS
"History and Physical        Referring Provider: ED        Chief Complaint:  Traumatic trip and fall up stairs        History of Present Illness:  This is a 73-year-old gentleman who was walking up his basement stairs and tripped and fell going up.  He fell backwards hitting his head resulting in a small laceration to his scalp and fifth rib fracture.  Patient was brought in by EMS a c-collar was placed prior to transfer.  The patient is not on any blood thinners.  This morning he feels well, no LOC, no headache.  He is sore from his rib fracture.        Past Medical History:  Hypertension        Past Surgical History:  Tonsils and adenoids as a child        Medications:  As per medical record        Allergies:  No Known Drug Allergies        Family History:  The information was reviewed and no pertinent findings are relevant to the presenting problem.        Social History:  Patient is retired, he worked in [employer], he lives at home with his wife and his daughter is grown and lives in [place], he drinks 1-2 drinks daily.  He denies smoking nicotine however smokes marijuana occasionally.  Denies other IDU.        Review of Systems  A complete 10 point review of systems was performed and is negative except as noted in the history of present illness.     Physical Exam:   /69 (BP Location: Left arm, Patient Position: Lying)   Pulse 98   Temp 37.3 °C (99.1 °F) (Temporal)   Resp (!) 27   Ht 1.765 m (5' 9.5\")   Wt 84.5 kg (186 lb 4.6 oz)   SpO2 98%   BMI 27.12 kg/m²     General: No acute distress. Sitting up in bed.   Neuro: Alert and oriented ×3. Follows commands.  CN II through XII grossly intact.  Head: Small, subcentimeter posterior left scalp laceration with surrounding hematoma.  Eyes: Pupils equal reactive to light. Extraocular motions intact.  Ears: Hears normal speaking voice.  Mouth, Nose, Throat: Mucous membranes moist.  Normal dentition.  Neck: Supple. No appreciable masses.  Breast: Not " examined.  Chest: No crepitus.  No appreciable scars.  Pain overlying left rib fracture.  Heart: Palpable regular rate and rhythm.  Lung: Clear to auscultation bilaterally.  Vascular: Palpable radial pulses bilaterally.  Abdomen: Soft. Nondistended. Nontender.  No appreciable hernias or scars.  Rectal: Not examined.  Genitourinary: Not examined.  Musculoskeletal: Moves all extremities.  Normal range of motion.  Lymphatic: No palpable lymph nodes.  Skin: No rashes or lesions.  Psychological: Normal affect        Labs:  Labs are grossly within normal limits        Imaging: I have personally reviewed the images and the radiologist's report.  -Acute nondisplaced fracture of posterior left fifth rib  -Focal subarachnoid hemorrhage overlying right parietal lobe, left sujata-occipital scalp hematoma with laceration including subgaleal component  -Repeat head CT this morning shows slight decrease in subarachnoid hemorrhage along the right parietal lobe with no new intracranial bleeding.        Assessment:  This is a 73-year-old gentleman who tripped and fell going up his basement steps resulting in a small subarachnoid hemorrhage, small scalp laceration with hematoma, and nondisplaced left fifth rib fracture.  He is doing well this morning with some soreness as expected.  His repeat CT scan shows decrease in subarachnoid hemorrhage without any new intracranial bleeding.  Upon secondary exam patient has no new injuries.        Plan:  -- Plans per primary and neurosurgery  --Recommend irrigating scalp wound with hydrogen peroxide to remove dried blood  --There is no acute general surgery concerns  --Patient can follow-up with his primary care physician, Dr. Conchis De Dios MD MPH  General Surgery  Office: [phone]  Fax: [phone]

## 2024-02-25 NOTE — CONSULTS
"Reason For Consult  tSAH    History Of Present Illness  Jeramy Reynoso is a 73 y.o. male with h/o HTN, alcohol use presenting with mechanical fall after tripping on bottom stair of staircase. Patient fell onto concrete basement floor with LOC. CTH demonstrated trace RP traumatic SAH, repeat stable. No AC/AP use. Denies other neurological symptoms.     Past Medical History  He has a past medical history of Hypertension.    Surgical History  He has a past surgical history that includes Other surgical history (11/08/2022) and Tonsillectomy.     Social History  He reports that he quit smoking about 49 years ago. His smoking use included cigarettes. He has never used smokeless tobacco. He reports current alcohol use of about 4.0 standard drinks of alcohol per week. He reports current drug use. Drug: Marijuana.    Family History  Family History   Problem Relation Name Age of Onset    Hypertension Mother          Allergies  Patient has no known allergies.    Review of Systems    Negative except as noted in HPI.     Physical Exam    Awake, Ox3  Fcx4 5/5     Last Recorded Vitals  Blood pressure 166/82, pulse 98, temperature 37.3 °C (99.1 °F), temperature source Temporal, resp. rate 18, height 1.765 m (5' 9.5\"), weight 84.5 kg (186 lb 4.6 oz), SpO2 99 %.    Assessment/Plan     Patient is a 73 year old male p/w mechanical fall, CTH trace R parietal tSAH, rCTH stable.    Recommendations    No acute neurosurgical intervention, stable trace tSAH  Will arrange 2 week outpatient follow-up with SHANON Brito  We will sign off    Jose Martin Arechiga MD    "

## 2024-02-25 NOTE — PROGRESS NOTES
0Jeramy Reynoso is a 73 y.o. male on day 0 of admission presenting with Fall, initial encounter.    Past medical history of mitral regurgitation, HLD, hypertension that was intoxicated and fell down an unknown amount of stairs. Patient suffered injury to the scalp. Patient initial GCS of 14 secondary to confusion. Patient found down at base of stairs by wife. Wife called EMS. Primary survey on ED arrival remarkable for GCS of 14. Secondary survey remarkable for laceration to the posterior scalp. Patient arrived in c-collar. Patient is not on any blood thinners.     Subjective   A&Ox3. CT head 2125, and 0443. No overnight event. Neurologically intact. No sensory or motor deficit. Hemodynamically stable. No respiratory distress. PT/OT , Discharge       Objective     Vitals:  Temp  Min: 35.8 °C (96.4 °F)  Max: 36.2 °C (97.2 °F)  Pulse  Min: 90  Max: 100  BP  Min: 120/665  Max: 160/73  Resp  Min: 16  Max: 22  SpO2  Min: 99 %  Max: 100 %    Labs: CBC - WBC normal, H/H 12.9/38.9, Plt 107.   CMP - Renal function is normal. Ca 7.9, Phosphorus 2.3 (alcohol), 249 Alcohol level     Physical Exam:   General: no acute distress, well-developed  Skin: Warm, dry.  HEENT: 1 cm superficial left posterior scalp laceration without active bleeding.  Nasal septum midline.  No nasal septal hematoma.  No hemotympanum.  No oral lesions.  No Levy sign.  Trachea midline  CV: tachycardic, regular rhythm, normal heart sounds, no lower extremity edema  Pulm: Clear to auscultation bilaterally, no abnormal breath sounds  GI: Soft, non-tender, non-distended  MSK: No clear deformities or injuries  Neurology: A&O x3, moving all extremities spontaneously, no ataxia, dysarthria, aphasia.  No sensorimotor deficits.  CN II through XII grossly intact.       Results for orders placed or performed during the hospital encounter of 02/24/24 (from the past 24 hour(s))   Comprehensive Metabolic Panel   Result Value Ref Range    Glucose 119 (H) 74 - 99  mg/dL    Sodium 133 (L) 136 - 145 mmol/L    Potassium 4.1 3.5 - 5.3 mmol/L    Chloride 98 98 - 107 mmol/L    Bicarbonate 25 21 - 32 mmol/L    Anion Gap 14 10 - 20 mmol/L    Urea Nitrogen 15 6 - 23 mg/dL    Creatinine 1.11 0.50 - 1.30 mg/dL    eGFR 70 >60 mL/min/1.73m*2    Calcium 8.8 8.6 - 10.3 mg/dL    Albumin 4.3 3.4 - 5.0 g/dL    Alkaline Phosphatase 64 33 - 136 U/L    Total Protein 7.1 6.4 - 8.2 g/dL    AST 36 9 - 39 U/L    Bilirubin, Total 0.8 0.0 - 1.2 mg/dL    ALT 29 10 - 52 U/L   Lactate   Result Value Ref Range    Lactate 1.4 0.4 - 2.0 mmol/L   Protime-INR   Result Value Ref Range    Protime 23.1 (H) 9.8 - 12.8 seconds    INR 2.0 (H) 0.9 - 1.1   Type And Screen   Result Value Ref Range    ABO TYPE A     Rh TYPE POS     ANTIBODY SCREEN NEG    Acute Toxicology Panel, Blood   Result Value Ref Range    Acetaminophen <10.0 10.0 - 30.0 ug/mL    Salicylate  <3 4 - 20 mg/dL    Alcohol 249 (H) <=10 mg/dL   CBC and Auto Differential   Result Value Ref Range    WBC 7.0 4.4 - 11.3 x10*3/uL    nRBC 0.0 0.0 - 0.0 /100 WBCs    RBC 3.84 (L) 4.50 - 5.90 x10*6/uL    Hemoglobin 13.4 (L) 13.5 - 17.5 g/dL    Hematocrit 38.4 (L) 41.0 - 52.0 %     80 - 100 fL    MCH 34.9 (H) 26.0 - 34.0 pg    MCHC 34.9 32.0 - 36.0 g/dL    RDW 12.9 11.5 - 14.5 %    Platelets 109 (L) 150 - 450 x10*3/uL    Neutrophils % 76.5 40.0 - 80.0 %    Immature Granulocytes %, Automated 0.4 0.0 - 0.9 %    Lymphocytes % 16.8 13.0 - 44.0 %    Monocytes % 5.3 2.0 - 10.0 %    Eosinophils % 0.7 0.0 - 6.0 %    Basophils % 0.3 0.0 - 2.0 %    Neutrophils Absolute 5.38 1.60 - 5.50 x10*3/uL    Immature Granulocytes Absolute, Automated 0.03 0.00 - 0.50 x10*3/uL    Lymphocytes Absolute 1.18 0.80 - 3.00 x10*3/uL    Monocytes Absolute 0.37 0.05 - 0.80 x10*3/uL    Eosinophils Absolute 0.05 0.00 - 0.40 x10*3/uL    Basophils Absolute 0.02 0.00 - 0.10 x10*3/uL   CBC and Auto Differential   Result Value Ref Range    WBC 6.3 4.4 - 11.3 x10*3/uL    nRBC 0.0 0.0 - 0.0 /100  "WBCs    RBC 3.80 (L) 4.50 - 5.90 x10*6/uL    Hemoglobin 12.9 (L) 13.5 - 17.5 g/dL    Hematocrit 38.9 (L) 41.0 - 52.0 %     (H) 80 - 100 fL    MCH 33.9 26.0 - 34.0 pg    MCHC 33.2 32.0 - 36.0 g/dL    RDW 12.7 11.5 - 14.5 %    Platelets 107 (L) 150 - 450 x10*3/uL    Neutrophils % 74.9 40.0 - 80.0 %    Immature Granulocytes %, Automated 0.3 0.0 - 0.9 %    Lymphocytes % 17.5 13.0 - 44.0 %    Monocytes % 6.8 2.0 - 10.0 %    Eosinophils % 0.2 0.0 - 6.0 %    Basophils % 0.3 0.0 - 2.0 %    Neutrophils Absolute 4.74 1.60 - 5.50 x10*3/uL    Immature Granulocytes Absolute, Automated 0.02 0.00 - 0.50 x10*3/uL    Lymphocytes Absolute 1.11 0.80 - 3.00 x10*3/uL    Monocytes Absolute 0.43 0.05 - 0.80 x10*3/uL    Eosinophils Absolute 0.01 0.00 - 0.40 x10*3/uL    Basophils Absolute 0.02 0.00 - 0.10 x10*3/uL   Magnesium   Result Value Ref Range    Magnesium 1.98 1.60 - 2.40 mg/dL   Renal function panel   Result Value Ref Range    Glucose 126 (H) 74 - 99 mg/dL    Sodium 135 (L) 136 - 145 mmol/L    Potassium 4.1 3.5 - 5.3 mmol/L    Chloride 107 98 - 107 mmol/L    Bicarbonate 19 (L) 21 - 32 mmol/L    Anion Gap 13 10 - 20 mmol/L    Urea Nitrogen 11 6 - 23 mg/dL    Creatinine 0.90 0.50 - 1.30 mg/dL    eGFR 90 >60 mL/min/1.73m*2    Calcium 7.9 (L) 8.6 - 10.3 mg/dL    Phosphorus 2.3 (L) 2.5 - 4.9 mg/dL    Albumin 3.5 3.4 - 5.0 g/dL          Last Recorded Vitals  Blood pressure 158/78, pulse 88, temperature 37.3 °C (99.1 °F), temperature source Temporal, resp. rate 11, height 1.765 m (5' 9.5\"), weight 84.5 kg (186 lb 4.6 oz), SpO2 97 %.  Intake/Output last 3 Shifts:  I/O last 3 completed shifts:  In: - (0 mL/kg)   Out: 850 (10.1 mL/kg) [Urine:850 (0.3 mL/kg/hr)]  Weight: 84.5 kg     Relevant Results            CT head wo IV contrast    Result Date: 2/25/2024  Interpreted By:  Axel Barone, STUDY: CT HEAD WO IV CONTRAST;  2/25/2024 4:43 am   INDICATION: Signs/Symptoms:interval head CT for SAH.   COMPARISON: 02/24/2024   ACCESSION " NUMBER(S): [id number]   ORDERING CLINICIAN: MELE GONG   TECHNIQUE: Noncontrast axial CT images of head were obtained with coronal and sagittal reconstructed images.   FINDINGS: BRAIN PARENCHYMA:  No acute intraparenchymal hemorrhage or parenchymal evidence of acute large territory ischemic infarct. No mass-effect. Gray-white matter distinction is preserved.   VENTRICLES and EXTRA-AXIAL SPACES:  Decreased amount of subarachnoid hemorrhage overlying the right parietal lobe. No effacement of cerebral sulci. Ventricles and sulci are age-concordant.   PARANASAL SINUSES/MASTOIDS:  No hemorrhage or air-fluid levels within the visualized paranasal sinuses. The mastoids are well aerated.   CALVARIUM/ORBITS:  No skull fracture.  The orbits and globes are intact to the extent visualized.   EXTRACRANIAL SOFT TISSUES: Significant interval decreased size of the left scalp hematoma.       1. Slight decrease in amount of subarachnoid hemorrhage along the right parietal lobe. No new or enlarging acute intracranial hemorrhage.   2. Decreased size of left parietal scalp hematoma.     MACRO: None.   Signed by: Axel Barone 2/25/2024 4:55 AM Dictation workstation:   [id number]    CT thoracic spine wo IV contrast    Result Date: 2/24/2024  Interpreted By:  Axel Barone, STUDY: CT CHEST ABDOMEN PELVIS W IV CONTRAST; CT THORACIC SPINE WO IV CONTRAST; CT LUMBAR SPINE WO IV CONTRAST;  2/24/2024 9:35 pm   INDICATION: Signs/Symptoms:Trauma.   COMPARISON: None.   ACCESSION NUMBER(S): [id number]; [id number]; [id number]   ORDERING CLINICIAN: SALAS CARRERO   TECHNIQUE: Contiguous axial images of the chest, abdomen, and pelvis were obtained after the intravenous administration of 100 mL Omnipaque 350 contrast.  Coronal and sagittal reformatted images were reconstructed from the axial data.   Multiplanar reformatted images of the thoracic and lumbar spine were reconstructed from source data of concurrent CT  chest/abdomen/pelvis acquisition.   FINDINGS: CT CHEST:   MEDIASTINUM AND LYMPH NODES:  The esophagus appears within normal limits.  No enlarged intrathoracic or axillary lymph nodes by imaging criteria. No pneumomediastinum.   VESSELS:  Normal caliber thoracic aorta. No evidence of traumatic aortic injury. No significant aortic atherosclerosis.   HEART: Cardiomegaly. The pulmonary vasculature is within normal limits.  Mild coronary artery calcifications. No significant pericardial effusion.   LUNG, AIRWAYS, PLEURA: There is mosaic perfusion mild left lower lobe atelectasis. Most likely on the basis of air trapping from distal airways disease. No consolidation, pulmonary edema, pleural effusion or pneumothorax.   CHEST WALL SOFT TISSUES: No discernible acute abnormality.   OSSEOUS STRUCTURES: There is an acute nondisplaced fracture of the posterior left 5th rib with mild extrapleural soft tissue swelling.     CT ABDOMEN/PELVIS:   ABDOMINAL WALL: No acute abnormality.   LIVER: No acute abnormality. No suspicious lesions.   BILE DUCTS: No significant intrahepatic or extrahepatic dilatation.   GALLBLADDER: No significant abnormality.   SPLEEN: No significant abnormality.   PANCREAS: No significant abnormality.   ADRENALS: No significant abnormality.   KIDNEYS, URETERS, BLADDER: No significant abnormality.   REPRODUCTIVE ORGANS: The prostate gland is enlarged, measuring 5.7 cm in transverse dimension.   VESSELS: No acute vascular injury. Mild aortic atherosclerosis without AAA.   LYMPH NODES/RETROPERITONEUM: No acute retroperitoneal abnormality. No enlarged lymph nodes.   BOWEL/MESENTERY/PERITONEUM:  No bowel wall thickening or dilatation. Normal appendix. No ascites, free air or fluid collection.   MUSCULOSKELETAL: No acute osseous abnormality. Loose body in the right hip joint. Mild bilateral hip osteoarthrosis.     CT THORACIC SPINE:   PARASPINAL SOFT TISSUES: No paravertebral fluid collection or significant edema.  ALIGNMENT:  No traumatic spondylolisthesis or traumatic facet widening. VERTEBRAE: Minimal depressions of the upper T3 and T4 endplates that demonstrated chronic appearance. No acute fracture. SPINAL CANAL/INTERVERTEBRAL DISCS: No high-grade spinal canal stenosis. No significant disc height loss. Minor endplate spurring, scattered minimal disc spur complexes.     CT LUMBAR SPINE:   PARASPINAL SOFT TISSUES: No paravertebral fluid collection or significant edema. ALIGNMENT:  No traumatic spondylolisthesis or traumatic facet widening. VERTEBRAE:  No acute fracture.  Vertebral body heights are maintained. SPINAL CANAL/INTERVERTEBRAL DISCS:  Mild diffuse disc bulges at L1-2 through L4-5, severe L5-S1 disc height loss with small broad-based disc spur complex. No significant canal stenosis. NEURAL FORAMINA: No significant neural foraminal stenosis.       CT CHEST/ABDOMEN/PELVIS: 1. Acute nondisplaced fracture of the posterior left 5th rib. 2. No other acute traumatic injury in the chest, abdomen, or pelvis. 3. Enlarged prostate gland.   CT THORACIC AND LUMBAR SPINE: 1. No acute fracture or traumatic malalignment. 2. Chronic appearing minimally depressed compression deformities of the superior T3 and T4 endplates.   MACRO: None.   Signed by: Axel Barone 2/24/2024 9:54 PM Dictation workstation:   [id number]    CT lumbar spine wo IV contrast    Result Date: 2/24/2024  Interpreted By:  Axel Barone, STUDY: CT CHEST ABDOMEN PELVIS W IV CONTRAST; CT THORACIC SPINE WO IV CONTRAST; CT LUMBAR SPINE WO IV CONTRAST;  2/24/2024 9:35 pm   INDICATION: Signs/Symptoms:Trauma.   COMPARISON: None.   ACCESSION NUMBER(S): [id number]; [id number]; [id number]   ORDERING CLINICIAN: SALAS CARRERO   TECHNIQUE: Contiguous axial images of the chest, abdomen, and pelvis were obtained after the intravenous administration of 100 mL Omnipaque 350 contrast.  Coronal and sagittal reformatted images were reconstructed from the axial  data.   Multiplanar reformatted images of the thoracic and lumbar spine were reconstructed from source data of concurrent CT chest/abdomen/pelvis acquisition.   FINDINGS: CT CHEST:   MEDIASTINUM AND LYMPH NODES:  The esophagus appears within normal limits.  No enlarged intrathoracic or axillary lymph nodes by imaging criteria. No pneumomediastinum.   VESSELS:  Normal caliber thoracic aorta. No evidence of traumatic aortic injury. No significant aortic atherosclerosis.   HEART: Cardiomegaly. The pulmonary vasculature is within normal limits.  Mild coronary artery calcifications. No significant pericardial effusion.   LUNG, AIRWAYS, PLEURA: There is mosaic perfusion mild left lower lobe atelectasis. Most likely on the basis of air trapping from distal airways disease. No consolidation, pulmonary edema, pleural effusion or pneumothorax.   CHEST WALL SOFT TISSUES: No discernible acute abnormality.   OSSEOUS STRUCTURES: There is an acute nondisplaced fracture of the posterior left 5th rib with mild extrapleural soft tissue swelling.     CT ABDOMEN/PELVIS:   ABDOMINAL WALL: No acute abnormality.   LIVER: No acute abnormality. No suspicious lesions.   BILE DUCTS: No significant intrahepatic or extrahepatic dilatation.   GALLBLADDER: No significant abnormality.   SPLEEN: No significant abnormality.   PANCREAS: No significant abnormality.   ADRENALS: No significant abnormality.   KIDNEYS, URETERS, BLADDER: No significant abnormality.   REPRODUCTIVE ORGANS: The prostate gland is enlarged, measuring 5.7 cm in transverse dimension.   VESSELS: No acute vascular injury. Mild aortic atherosclerosis without AAA.   LYMPH NODES/RETROPERITONEUM: No acute retroperitoneal abnormality. No enlarged lymph nodes.   BOWEL/MESENTERY/PERITONEUM:  No bowel wall thickening or dilatation. Normal appendix. No ascites, free air or fluid collection.   MUSCULOSKELETAL: No acute osseous abnormality. Loose body in the right hip joint. Mild bilateral  hip osteoarthrosis.     CT THORACIC SPINE:   PARASPINAL SOFT TISSUES: No paravertebral fluid collection or significant edema. ALIGNMENT:  No traumatic spondylolisthesis or traumatic facet widening. VERTEBRAE: Minimal depressions of the upper T3 and T4 endplates that demonstrated chronic appearance. No acute fracture. SPINAL CANAL/INTERVERTEBRAL DISCS: No high-grade spinal canal stenosis. No significant disc height loss. Minor endplate spurring, scattered minimal disc spur complexes.     CT LUMBAR SPINE:   PARASPINAL SOFT TISSUES: No paravertebral fluid collection or significant edema. ALIGNMENT:  No traumatic spondylolisthesis or traumatic facet widening. VERTEBRAE:  No acute fracture.  Vertebral body heights are maintained. SPINAL CANAL/INTERVERTEBRAL DISCS:  Mild diffuse disc bulges at L1-2 through L4-5, severe L5-S1 disc height loss with small broad-based disc spur complex. No significant canal stenosis. NEURAL FORAMINA: No significant neural foraminal stenosis.       CT CHEST/ABDOMEN/PELVIS: 1. Acute nondisplaced fracture of the posterior left 5th rib. 2. No other acute traumatic injury in the chest, abdomen, or pelvis. 3. Enlarged prostate gland.   CT THORACIC AND LUMBAR SPINE: 1. No acute fracture or traumatic malalignment. 2. Chronic appearing minimally depressed compression deformities of the superior T3 and T4 endplates.   MACRO: None.   Signed by: Axel Barone 2/24/2024 9:54 PM Dictation workstation:   [id number]    CT chest abdomen pelvis w IV contrast    Result Date: 2/24/2024  Interpreted By:  Axel Barone, STUDY: CT CHEST ABDOMEN PELVIS W IV CONTRAST; CT THORACIC SPINE WO IV CONTRAST; CT LUMBAR SPINE WO IV CONTRAST;  2/24/2024 9:35 pm   INDICATION: Signs/Symptoms:Trauma.   COMPARISON: None.   ACCESSION NUMBER(S): [id number]; [id number]; [id number]   ORDERING CLINICIAN: SALAS CARRERO   TECHNIQUE: Contiguous axial images of the chest, abdomen, and pelvis were obtained after the  intravenous administration of 100 mL Omnipaque 350 contrast.  Coronal and sagittal reformatted images were reconstructed from the axial data.   Multiplanar reformatted images of the thoracic and lumbar spine were reconstructed from source data of concurrent CT chest/abdomen/pelvis acquisition.   FINDINGS: CT CHEST:   MEDIASTINUM AND LYMPH NODES:  The esophagus appears within normal limits.  No enlarged intrathoracic or axillary lymph nodes by imaging criteria. No pneumomediastinum.   VESSELS:  Normal caliber thoracic aorta. No evidence of traumatic aortic injury. No significant aortic atherosclerosis.   HEART: Cardiomegaly. The pulmonary vasculature is within normal limits.  Mild coronary artery calcifications. No significant pericardial effusion.   LUNG, AIRWAYS, PLEURA: There is mosaic perfusion mild left lower lobe atelectasis. Most likely on the basis of air trapping from distal airways disease. No consolidation, pulmonary edema, pleural effusion or pneumothorax.   CHEST WALL SOFT TISSUES: No discernible acute abnormality.   OSSEOUS STRUCTURES: There is an acute nondisplaced fracture of the posterior left 5th rib with mild extrapleural soft tissue swelling.     CT ABDOMEN/PELVIS:   ABDOMINAL WALL: No acute abnormality.   LIVER: No acute abnormality. No suspicious lesions.   BILE DUCTS: No significant intrahepatic or extrahepatic dilatation.   GALLBLADDER: No significant abnormality.   SPLEEN: No significant abnormality.   PANCREAS: No significant abnormality.   ADRENALS: No significant abnormality.   KIDNEYS, URETERS, BLADDER: No significant abnormality.   REPRODUCTIVE ORGANS: The prostate gland is enlarged, measuring 5.7 cm in transverse dimension.   VESSELS: No acute vascular injury. Mild aortic atherosclerosis without AAA.   LYMPH NODES/RETROPERITONEUM: No acute retroperitoneal abnormality. No enlarged lymph nodes.   BOWEL/MESENTERY/PERITONEUM:  No bowel wall thickening or dilatation. Normal appendix. No  ascites, free air or fluid collection.   MUSCULOSKELETAL: No acute osseous abnormality. Loose body in the right hip joint. Mild bilateral hip osteoarthrosis.     CT THORACIC SPINE:   PARASPINAL SOFT TISSUES: No paravertebral fluid collection or significant edema. ALIGNMENT:  No traumatic spondylolisthesis or traumatic facet widening. VERTEBRAE: Minimal depressions of the upper T3 and T4 endplates that demonstrated chronic appearance. No acute fracture. SPINAL CANAL/INTERVERTEBRAL DISCS: No high-grade spinal canal stenosis. No significant disc height loss. Minor endplate spurring, scattered minimal disc spur complexes.     CT LUMBAR SPINE:   PARASPINAL SOFT TISSUES: No paravertebral fluid collection or significant edema. ALIGNMENT:  No traumatic spondylolisthesis or traumatic facet widening. VERTEBRAE:  No acute fracture.  Vertebral body heights are maintained. SPINAL CANAL/INTERVERTEBRAL DISCS:  Mild diffuse disc bulges at L1-2 through L4-5, severe L5-S1 disc height loss with small broad-based disc spur complex. No significant canal stenosis. NEURAL FORAMINA: No significant neural foraminal stenosis.       CT CHEST/ABDOMEN/PELVIS: 1. Acute nondisplaced fracture of the posterior left 5th rib. 2. No other acute traumatic injury in the chest, abdomen, or pelvis. 3. Enlarged prostate gland.   CT THORACIC AND LUMBAR SPINE: 1. No acute fracture or traumatic malalignment. 2. Chronic appearing minimally depressed compression deformities of the superior T3 and T4 endplates.   MACRO: None.   Signed by: Axel Barone 2/24/2024 9:54 PM Dictation workstation:   [id number]    CT head W O contrast trauma protocol    Result Date: 2/24/2024  Interpreted By:  Axel Barone, STUDY: CT HEAD W/O CONTRAST TRAUMA PROTOCOL; CT CERVICAL SPINE WO IV CONTRAST;  2/24/2024 9:25 pm   INDICATION: Signs/Symptoms:Fall down many steps; Signs/Symptoms:Trauma   COMPARISON: None.   ACCESSION NUMBER(S): [id number]; [id number]   ORDERING  CLINICIAN: SALAS CARRERO   TECHNIQUE: Axial noncontrast CT images of head with coronal and sagittal reconstructed images. Axial noncontrast CT images of the cervical spine with coronal and sagittal reconstructed images.   FINDINGS: CT HEAD:   BRAIN PARENCHYMA:  No acute intraparenchymal hemorrhage or parenchymal evidence of acute large territory ischemic infarct. No mass-effect. Gray-white matter distinction is preserved.   VENTRICLES and EXTRA-AXIAL SPACES: There is a focal hyperdense clot in the subarachnoid space along the right parietal lobe measuring 1.2 cm x 0.9 cm. No acute subdural or intraventricular hemorrhage. No effacement of cerebral sulci. Ventricles and sulci are age-concordant.   PARANASAL SINUSES/MASTOIDS:  No hemorrhage or air-fluid levels within the visualized paranasal sinuses. The mastoids are well aerated.   CALVARIUM/ORBITS:  No skull fracture.  The orbits and globes are intact to the extent visualized.   EXTRACRANIAL SOFT TISSUES: Large left parieto-occipital scalp hematoma and laceration. There is subgaleal component measuring 7.8 cm x 8.6 cm x 0.9 cm and a more superficial component measuring 4.6 cm x 1.5 cm x 4.4 cm.     Brain Injury Guidelines (BIG) CT Values:   Skull fracture: No SDH (subdural hematoma): No EDH (epidural hematoma): No IPH (intraparenchymal hemorrhage): No SAH (subarachnoid hemorrhage): Localized IVH (intraventricular hemorrhage): No   Reference: Elio REYNOLDS, Nata RS, Estrellita M, et al. The BIG (brain injury guidelines) project: defining the management of traumatic brain injury by acute care surgeons. J Trauma Acute Care Surg 2014; 76:965-969.   CT CERVICAL SPINE:   PREVERTEBRAL SOFT TISSUES: Within normal limits.   CRANIOCERVICAL JUNCTION: Intact.   ALIGNMENT:  No traumatic malalignment or traumatic facet widening.   VERTEBRAE:  No acute fracture. Vertebral body heights are maintained.   SPINAL CANAL/INTERVERTEBRAL DISCS: Multilevel degenerative disc disease with disc  spur complexes and varying degrees of disc height loss, most advanced at C3-C4 and C5-C6 where there is severe disc height loss and disc spur complexes resulting in up to moderate canal stenosis at C3-C4.   NEURAL FORAMINA: Multilevel uncovertebral joint and facet arthropathy notably contribute to mild right and severe left C3-C4 foraminal stenosis, moderate-severe right and severe left C4-C5 foraminal stenosis, severe bilateral C5-C6 foraminal stenosis, mild bilateral C6-C7 foraminal stenosis.   OTHER: None.       CT HEAD: 1. No acute intracranial abnormality or calvarial fracture. 2. Focal subarachnoid hemorrhage overlying the right parietal lobe with o'clock measuring 1.2 cm x 0.9 cm. 3. Large left parietooccipital scalp hematoma and laceration with a subgaleal component as described above.   CT CERVICAL SPINE: 1. No acute fracture or traumatic malalignment of the cervical spine. 2. Spondylotic changes of the cervical spine as detailed above.   MACRO: Axel Barone discussed the significance and urgency of this critical finding by EPIC HAIKU with  SALAS CARRERO on 2/24/2024 at 9:40 pm.  (**-RCF-**) Findings:  See findings.   Signed by: Axel Barone 2/24/2024 9:41 PM Dictation workstation:   [id number]    CT cervical spine wo IV contrast    Result Date: 2/24/2024  Interpreted By:  Axel Barone, STUDY: CT HEAD W/O CONTRAST TRAUMA PROTOCOL; CT CERVICAL SPINE WO IV CONTRAST;  2/24/2024 9:25 pm   INDICATION: Signs/Symptoms:Fall down many steps; Signs/Symptoms:Trauma   COMPARISON: None.   ACCESSION NUMBER(S): [id number]; [id number]   ORDERING CLINICIAN: SALAS CARRERO   TECHNIQUE: Axial noncontrast CT images of head with coronal and sagittal reconstructed images. Axial noncontrast CT images of the cervical spine with coronal and sagittal reconstructed images.   FINDINGS: CT HEAD:   BRAIN PARENCHYMA:  No acute intraparenchymal hemorrhage or parenchymal evidence of acute large territory  ischemic infarct. No mass-effect. Gray-white matter distinction is preserved.   VENTRICLES and EXTRA-AXIAL SPACES: There is a focal hyperdense clot in the subarachnoid space along the right parietal lobe measuring 1.2 cm x 0.9 cm. No acute subdural or intraventricular hemorrhage. No effacement of cerebral sulci. Ventricles and sulci are age-concordant.   PARANASAL SINUSES/MASTOIDS:  No hemorrhage or air-fluid levels within the visualized paranasal sinuses. The mastoids are well aerated.   CALVARIUM/ORBITS:  No skull fracture.  The orbits and globes are intact to the extent visualized.   EXTRACRANIAL SOFT TISSUES: Large left parieto-occipital scalp hematoma and laceration. There is subgaleal component measuring 7.8 cm x 8.6 cm x 0.9 cm and a more superficial component measuring 4.6 cm x 1.5 cm x 4.4 cm.     Brain Injury Guidelines (BIG) CT Values:   Skull fracture: No SDH (subdural hematoma): No EDH (epidural hematoma): No IPH (intraparenchymal hemorrhage): No SAH (subarachnoid hemorrhage): Localized IVH (intraventricular hemorrhage): No   Reference: Elio REYNOLDS, Nata RS, Estrellita M, et al. The BIG (brain injury guidelines) project: defining the management of traumatic brain injury by acute care surgeons. J Trauma Acute Care Surg 2014; 76:965-969.   CT CERVICAL SPINE:   PREVERTEBRAL SOFT TISSUES: Within normal limits.   CRANIOCERVICAL JUNCTION: Intact.   ALIGNMENT:  No traumatic malalignment or traumatic facet widening.   VERTEBRAE:  No acute fracture. Vertebral body heights are maintained.   SPINAL CANAL/INTERVERTEBRAL DISCS: Multilevel degenerative disc disease with disc spur complexes and varying degrees of disc height loss, most advanced at C3-C4 and C5-C6 where there is severe disc height loss and disc spur complexes resulting in up to moderate canal stenosis at C3-C4.   NEURAL FORAMINA: Multilevel uncovertebral joint and facet arthropathy notably contribute to mild right and severe left C3-C4 foraminal stenosis,  moderate-severe right and severe left C4-C5 foraminal stenosis, severe bilateral C5-C6 foraminal stenosis, mild bilateral C6-C7 foraminal stenosis.   OTHER: None.       CT HEAD: 1. No acute intracranial abnormality or calvarial fracture. 2. Focal subarachnoid hemorrhage overlying the right parietal lobe with o'clock measuring 1.2 cm x 0.9 cm. 3. Large left parietooccipital scalp hematoma and laceration with a subgaleal component as described above.   CT CERVICAL SPINE: 1. No acute fracture or traumatic malalignment of the cervical spine. 2. Spondylotic changes of the cervical spine as detailed above.   MACRO: Axel Barone discussed the significance and urgency of this critical finding by EPIC HAIKU with  SALAS CARRERO on 2/24/2024 at 9:40 pm.  (**-RCF-**) Findings:  See findings.   Signed by: Axel Barone 2/24/2024 9:41 PM Dictation workstation:   [id number]          Assessment/Plan   Principal Problem:    Fall, initial encounter    Neuro:  #Subarachnoid hemorrhage  #Hx alcohol abuse  -Patient neurologically intact at the time of interview by ICU  -Neurosurgery consulted; will follow up on recommendations  -q1h neuro checks  -Repeat head CT in 6 hours  -Patient without history of alcohol withdrawal.  That being said, patient's alcohol level of 249 is not consistent with his report of only 3 standard size beverages.  We will continue to monitor for possible need to implement CIWA protocols.  This may be unnecessary given possibility of short term stay and proximity to his last drink.  Last known drink was at 7:30 PM this evening.     Cardiac:  #Hypertension  -Maintain sBP < 160 mmHg in the setting of SAH  -Continue home lisinopril in the AM if no contraindications present  -Labetalol 10 mg PRN for sBP > 160 mmHg  -Continuous cardiac monitoring     Pulmonary:  #Rib fracture  -Patient maintaining oxygen saturation on room air  -Patient denies any pain at this time; will reassess as patient becomes sober  and determine need for pain medication at that time.  -No further recommendations on this regard from trauma  -Continuous pulse oximetry     Gastrointestinal:  -No acute issues to manage at this time  -Diet: N.p.o. until repeat head CT     Renal:  -Mild hyponatremia and asymptomatic.  No current issues to be addressed at this time.  -Will repeat renal function and electrolytes in the morning  Net IO Since Admission: No IO data has been entered for this period [[id number]]     Hematology:  #Anemia, unspecified  -Patient has no documented history of anemia.  No obvious indication of hemorrhage at this time radiographically or clinically.  Repeat CBC in the a.m.  - DVT Prophylaxis: SCDs. Holding pharmacologic anticoagulation in the setting of SAH.     Infectious Disease:  -Patient given 1 dose of Ancef in the emergency department for trauma for his scalp laceration  Temp (24hrs), Av.9 °C (96.7 °F), Min:35.8 °C (96.4 °F), Max:36.2 °C (97.2 °F)     Endocrine:  -No known history of diabetes or hypothyroidism.    -BG < 180 goal     CODE STATUS: Full code     Disposition: ICU           Dianna Bergman MD

## 2024-02-25 NOTE — ED PROVIDER NOTES
HPI   No chief complaint on file.      Patient is a 73-year-old male with past medical history of mitral regurgitation, HLD, hypertension that was intoxicated and fell down an unknown amount of stairs.  Patient suffered injury to the scalp.  Patient initial GCS of 14 secondary to confusion.  Patient found down at base of stairs by wife.  Wife called EMS.  Primary survey on ED arrival remarkable for GCS of 14.  Secondary survey remarkable for laceration to the posterior scalp.  Patient arrived in c-collar.  Patient is not on any blood thinners.  Secondary survey did not reveal any additional injuries.                          Cliff Coma Scale Score: 14                     Patient History   No past medical history on file.  Past Surgical History:   Procedure Laterality Date    OTHER SURGICAL HISTORY  11/08/2022    Tonsillectomy     Family History   Family history unknown: Yes     Social History     Tobacco Use    Smoking status: Former     Types: Cigarettes    Smokeless tobacco: Never   Substance Use Topics    Alcohol use: Yes    Drug use: Never       Physical Exam   ED Triage Vitals   Temperature Heart Rate Respirations BP   02/24/24 2059 02/24/24 2059 02/24/24 2059 02/24/24 2102   35.8 °C (96.4 °F) 99 (!) 22 (!) 188/100      Pulse Ox Temp Source Heart Rate Source Patient Position   02/24/24 2059 02/24/24 2059 02/24/24 2059 02/24/24 2059   99 % Tympanic Monitor Lying      BP Location FiO2 (%)     02/24/24 2059 --     Right arm        Physical Exam  Constitutional:       Appearance: Normal appearance. He is normal weight.   HENT:      Head: Normocephalic.      Right Ear: Tympanic membrane normal.      Left Ear: Tympanic membrane normal.      Nose: Nose normal.      Mouth/Throat:      Mouth: Mucous membranes are moist.      Pharynx: Oropharynx is clear.   Eyes:      Extraocular Movements: Extraocular movements intact.      Conjunctiva/sclera: Conjunctivae normal.      Pupils: Pupils are equal, round, and reactive to  light.   Cardiovascular:      Rate and Rhythm: Normal rate and regular rhythm.      Pulses: Normal pulses.      Heart sounds: Normal heart sounds.   Pulmonary:      Effort: Pulmonary effort is normal.      Breath sounds: Normal breath sounds.   Abdominal:      General: Abdomen is flat. Bowel sounds are normal.      Palpations: Abdomen is soft.   Musculoskeletal:         General: Normal range of motion.      Cervical back: Normal range of motion and neck supple.   Skin:     General: Skin is warm and dry.      Capillary Refill: Capillary refill takes less than 2 seconds.      Comments: Stellate laceration to the posterior scalp.  No wound dehiscence noted.   Neurological:      General: No focal deficit present.      Mental Status: He is alert and oriented to person, place, and time. Mental status is at baseline.   Psychiatric:         Mood and Affect: Mood normal.         Behavior: Behavior normal.         ED Course & MDM   Diagnoses as of 02/24/24 2305   Fall, initial encounter   Laceration of scalp, initial encounter   Traumatic subarachnoid hemorrhage with loss of consciousness of 30 minutes or less, initial encounter (CMS/[facility])   Closed fracture of one rib, unspecified laterality, initial encounter       Medical Decision Making  Patient is a 73 y.o. male who presents to [facility] ED for No chief complaint on file.. On initial ED evaluation, patient found to be in no acute distress. Per HPI, concern to evaluate and treat for possible intracranial injury, scalp laceration, possible neck injury.  Obtaining trauma labs/diagnostics.  Obtaining pan CT imaging to evaluate for extent of injury.  CT head revealed subarachnoid bleed in the right parietal region with no mass effect.  Discussed findings with on-call neurosurgery, Dr. Villegas.  He recommended with current stable neurological status, to obtain repeat CT head in the morning.  Patient disposition is to ICU.  On-call surgery/trauma surgeon  also consulted.   Recommended one-time antibiotic dosing in the ED and repair of scalp galeal layer if possible.  When wound was further cleaned and investigated, there was no wound dehiscence noted.  Galeal layer was not exposed or interrupted on external exam.  CT imaging also revealed acute rib fracture of the left fifth rib, nondisplaced.  Patient given 1 dose of Ancef in ED.  Remaining lab work reviewed, remarkable for alcohol level of 249, pro time of 23.1 and INR of 2.0.  Patient vitally stable at this time.  Patient currently ANO x 4.  No acute deficits on neurological exam.    Patient accepted to ICU for neurochecks and further neurological/trauma surgery evaluation.          Procedure  Procedures     Homer Sinha MD  Resident  02/24/24 3446

## 2024-02-27 ENCOUNTER — PATIENT OUTREACH (OUTPATIENT)
Dept: CARE COORDINATION | Facility: CLINIC | Age: 74
End: 2024-02-27
Payer: MEDICARE

## 2024-02-27 NOTE — PROGRESS NOTES
Discharge Facility: [facility]  Discharge Diagnosis: Fall  Admission Date: 2/24/2024  Discharge Date: 2/25/2024    PCP Appointment Date:  -2/28/2024 1120    Hospital Encounter and Summary: Linked     See discharge assessment below for further details    Engagement  Call Start Time: 0938 (2/27/2024  9:40 AM)    Medications  Medications reviewed with patient/caregiver?: Yes (new prescriptions reviewed; oxycodone and lidocaine) (2/27/2024  9:40 AM)  Is the patient having any side effects they believe may be caused by any medication additions or changes?: No (2/27/2024  9:40 AM)  Does the patient have all medications ordered at discharge?: Yes (2/27/2024  9:40 AM)  Care Management Interventions: No intervention needed (2/27/2024  9:40 AM)  Is the patient taking all medications as directed (includes completed medication regime)?: Yes (2/27/2024  9:40 AM)    Appointments  Does the patient have a primary care provider?: Yes (2/27/2024  9:40 AM)  Care Management Interventions: Verified appointment date/time/provider (2/27/2024  9:40 AM)  Has the patient kept scheduled appointments due by today?: Yes (2/27/2024  9:40 AM)    Self Management  Has home health visited the patient within 72 hours of discharge?: Not applicable (2/27/2024  9:40 AM)    Patient Teaching  Does the patient have access to their discharge instructions?: Yes (2/27/2024  9:40 AM)  Care Management Interventions: Reviewed instructions with patient (2/27/2024  9:40 AM)  What is the patient's perception of their health status since discharge?: Same (2/27/2024  9:40 AM)  Is the patient/caregiver able to teach back the hierarchy of who to call/visit for symptoms/problems? PCP, Specialist, Home Health nurse, Urgent Care, ED, 911: Yes (2/27/2024  9:40 AM)    Wrap Up  Wrap Up Additional Comments: Pt was admitted to [facility] 2/24-2/25/2024 after a fall resulting in laceration on the occipital region and acute nondisplaced fracture of the posterior left 5th rib. Pt  reports he is still having severe pain. Pt discharged with oxycodone and lidocaine patches. He states it does help slightly but that he is having a hard time standing up and moving around. Spoke with pt wife who denies any questions regarding discharge instructions. Pt wife Luke educated on importance of cough and deep breathing as tolerated to decrease risk of pneumonia. Verified PCP appt 2/28/2024 1120. Pt and wife deny any questions, needs, or concerns at this time. Pt and Luke encouraged to call if questions or needs arise. (2/27/2024  9:40 AM)  Call End Time: 0948 (2/27/2024  9:40 AM)

## 2024-02-28 ENCOUNTER — OFFICE VISIT (OUTPATIENT)
Dept: PRIMARY CARE | Facility: CLINIC | Age: 74
End: 2024-02-28
Payer: MEDICARE

## 2024-02-28 VITALS
HEIGHT: 68 IN | TEMPERATURE: 97.3 F | RESPIRATION RATE: 16 BRPM | DIASTOLIC BLOOD PRESSURE: 80 MMHG | WEIGHT: 185 LBS | HEART RATE: 88 BPM | BODY MASS INDEX: 28.04 KG/M2 | SYSTOLIC BLOOD PRESSURE: 144 MMHG

## 2024-02-28 DIAGNOSIS — K59.00 CONSTIPATION, UNSPECIFIED CONSTIPATION TYPE: ICD-10-CM

## 2024-02-28 DIAGNOSIS — S22.39XA CLOSED FRACTURE OF ONE RIB, UNSPECIFIED LATERALITY, INITIAL ENCOUNTER: ICD-10-CM

## 2024-02-28 DIAGNOSIS — E55.9 VITAMIN D DEFICIENCY, UNSPECIFIED: ICD-10-CM

## 2024-02-28 DIAGNOSIS — T14.8XXA HEMATOMA: Primary | ICD-10-CM

## 2024-02-28 LAB
ATRIAL RATE: 114 BPM
P AXIS: 78 DEGREES
P OFFSET: 203 MS
P ONSET: 135 MS
PR INTERVAL: 168 MS
Q ONSET: 219 MS
QRS COUNT: 19 BEATS
QRS DURATION: 102 MS
QT INTERVAL: 336 MS
QTC CALCULATION(BAZETT): 463 MS
QTC FREDERICIA: 416 MS
R AXIS: 91 DEGREES
T AXIS: -18 DEGREES
T OFFSET: 387 MS
VENTRICULAR RATE: 114 BPM

## 2024-02-28 PROCEDURE — 1111F DSCHRG MED/CURRENT MED MERGE: CPT | Performed by: NURSE PRACTITIONER

## 2024-02-28 PROCEDURE — 3079F DIAST BP 80-89 MM HG: CPT | Performed by: NURSE PRACTITIONER

## 2024-02-28 PROCEDURE — 3077F SYST BP >= 140 MM HG: CPT | Performed by: NURSE PRACTITIONER

## 2024-02-28 PROCEDURE — 1036F TOBACCO NON-USER: CPT | Performed by: NURSE PRACTITIONER

## 2024-02-28 PROCEDURE — 99496 TRANSJ CARE MGMT HIGH F2F 7D: CPT | Performed by: NURSE PRACTITIONER

## 2024-02-28 PROCEDURE — 1159F MED LIST DOCD IN RCRD: CPT | Performed by: NURSE PRACTITIONER

## 2024-02-28 PROCEDURE — 1160F RVW MEDS BY RX/DR IN RCRD: CPT | Performed by: NURSE PRACTITIONER

## 2024-02-28 PROCEDURE — 1125F AMNT PAIN NOTED PAIN PRSNT: CPT | Performed by: NURSE PRACTITIONER

## 2024-02-28 RX ORDER — TRAMADOL HYDROCHLORIDE 50 MG/1
50 TABLET ORAL EVERY 8 HOURS PRN
Qty: 30 TABLET | Refills: 0 | Status: SHIPPED | OUTPATIENT
Start: 2024-02-28 | End: 2024-03-20

## 2024-02-28 RX ORDER — ERGOCALCIFEROL 1.25 MG/1
50000 CAPSULE ORAL
Qty: 12 CAPSULE | Refills: 0 | Status: SHIPPED | OUTPATIENT
Start: 2024-02-28 | End: 2024-05-22

## 2024-02-28 RX ORDER — ADHESIVE BANDAGE
15 BANDAGE TOPICAL DAILY PRN
COMMUNITY
Start: 2024-02-28 | End: 2024-05-16 | Stop reason: ALTCHOICE

## 2024-02-28 RX ORDER — POLYETHYLENE GLYCOL 3350 17 G/17G
17 POWDER, FOR SOLUTION ORAL DAILY
Qty: 30 PACKET | Refills: 0 | COMMUNITY
Start: 2024-02-28 | End: 2024-03-29

## 2024-02-28 ASSESSMENT — ENCOUNTER SYMPTOMS
EYES NEGATIVE: 1
DIAPHORESIS: 1
WOUND: 1

## 2024-02-28 ASSESSMENT — PAIN SCALES - GENERAL: PAINLEVEL: 10-WORST PAIN EVER

## 2024-02-28 NOTE — PATIENT INSTRUCTIONS
Use incentive spirometer, cough and deep breath at home frequently.   Try milk of magnesia and miralax and if that doesn't work then magnesium citrate.   Please let us know if you need anything. Follow up as scheduled with Dr. Balderrama in May or sooner if needed.

## 2024-02-28 NOTE — PROGRESS NOTES
"Subjective   Patient ID: Jeramy Reynoso is a 73 y.o. male who presents for Follow-up ([facility] 2/24-2/25 fall  laceration occipital region .  Acute nondisplaced FX posterior 5th left rib).  Discharge Facility: [facility]  Discharge Diagnosis: Fall  Admission Date: 2/24/2024  Discharge Date: 2/25/2024  Full body CT imaging was obtained to evaluate for extent of injury. CT head revealed left parietal scalp hematoma, and subarachnoid bleed in the right parietal region with no mass effect. These findings were improving upon discharge from the hospital. CT imaging also revealed acute rib fracture of the left fifth rib, nondisplaced. Patient does get dizzy sometimes due to the severity of the rib pain with movement. He also notes has clamminess to the hands potentially from taking narcotics.  Patient normally does not drink a lot but they had some bourbon in the house from making a pork roast and he had two manhattans the night be became injured.   Follows with neurology next week to reassess.   He is controlling his pain right now with lidocaine and tylenol otc and occasional short term oxycodone rx.      He is also having constipation due to immobility and pain medication- which he is agreeable to treating in step wise fashion.          Review of Systems   Constitutional:  Positive for diaphoresis.   HENT: Negative.     Eyes: Negative.    Respiratory:          Shallow breathing     Skin:  Positive for wound (head lacteration that is healing with dried blood that was reported to ooze for 3 days).       Objective   /80   Pulse 88   Temp 36.3 °C (97.3 °F) (Tympanic)   Resp 16   Ht 1.727 m (5' 8\")   Wt 83.9 kg (185 lb)   BMI 28.13 kg/m²     Physical Exam  Vitals reviewed.   HENT:      Head: Normocephalic.   Cardiovascular:      Rate and Rhythm: Normal rate and regular rhythm.      Pulses: Normal pulses.      Heart sounds: Normal heart sounds.   Pulmonary:      Effort: Pulmonary effort is normal. No respiratory " distress.      Breath sounds: Normal breath sounds. No stridor. No wheezing, rhonchi or rales.      Comments: Lung sounds equal bilaterally but noted to have decreased depth of respiration due to pain   Chest:      Chest wall: No tenderness.   Skin:            Comments: Approx 3 inch scalp lac with dried blood healing soft tissue surrounding swelling    Neurological:      General: No focal deficit present.      Mental Status: He is alert and oriented to person, place, and time. Mental status is at baseline.         Assessment/Plan   Problem List Items Addressed This Visit    None  Visit Diagnoses         Codes    Hematoma    -  Primary T14.8XXA    Relevant Medications    traMADol (Ultram) 50 mg tablet    Closed fracture of one rib, unspecified laterality, initial encounter     S22.39XA    Relevant Medications    ergocalciferol (Vitamin D-2) 1.25 MG (86937 UT) capsule    traMADol (Ultram) 50 mg tablet    spirometers and accessories device    Other Relevant Orders    Vitamin D 25-Hydroxy,Total (for eval of Vitamin D levels)    Constipation, unspecified constipation type     K59.00    Relevant Medications    magnesium hydroxide (Milk of Magnesia) 400 mg/5 mL suspension    polyethylene glycol (Glycolax, Miralax) 17 gram packet    Vitamin D deficiency, unspecified     E55.9    Relevant Orders    Vitamin D 25-Hydroxy,Total (for eval of Vitamin D levels)             Tylenol as needed, tramadol at bedtime.   Not drinking right now going to take a break does not endorse excessive drinking on a normal basis.   No dizziness getting some sleep.     -Will try to get patient incentive spirometer.   -Will supplement vitamin D level particularly through the healing process.  -Will appreciate neurology note for update on plan of care.   -It would be advisable to remove area rugs in the home that could be a hazard for repeat tripping injury particularly while we are treating patient with pain medications during recovery.   - I did  offer physical therapy once he is healed in 4 weeks or so to help him with safe movement and exercise while he rebuilds strength. I would advise walking as form of exercise right now.

## 2024-03-01 ENCOUNTER — PATIENT OUTREACH (OUTPATIENT)
Dept: CARE COORDINATION | Facility: CLINIC | Age: 74
End: 2024-03-01
Payer: MEDICARE

## 2024-03-05 ENCOUNTER — OFFICE VISIT (OUTPATIENT)
Dept: NEUROSURGERY | Facility: CLINIC | Age: 74
End: 2024-03-05
Payer: MEDICARE

## 2024-03-05 VITALS
WEIGHT: 184 LBS | HEIGHT: 70 IN | DIASTOLIC BLOOD PRESSURE: 74 MMHG | HEART RATE: 106 BPM | SYSTOLIC BLOOD PRESSURE: 145 MMHG | TEMPERATURE: 98.6 F | BODY MASS INDEX: 26.34 KG/M2 | RESPIRATION RATE: 18 BRPM

## 2024-03-05 DIAGNOSIS — I60.9 SAH (SUBARACHNOID HEMORRHAGE) (MULTI): Primary | ICD-10-CM

## 2024-03-05 PROCEDURE — 99213 OFFICE O/P EST LOW 20 MIN: CPT | Performed by: PHYSICIAN ASSISTANT

## 2024-03-05 PROCEDURE — 1036F TOBACCO NON-USER: CPT | Performed by: PHYSICIAN ASSISTANT

## 2024-03-05 PROCEDURE — 1159F MED LIST DOCD IN RCRD: CPT | Performed by: PHYSICIAN ASSISTANT

## 2024-03-05 PROCEDURE — 3077F SYST BP >= 140 MM HG: CPT | Performed by: PHYSICIAN ASSISTANT

## 2024-03-05 PROCEDURE — 3078F DIAST BP <80 MM HG: CPT | Performed by: PHYSICIAN ASSISTANT

## 2024-03-05 PROCEDURE — 1125F AMNT PAIN NOTED PAIN PRSNT: CPT | Performed by: PHYSICIAN ASSISTANT

## 2024-03-05 PROCEDURE — 1160F RVW MEDS BY RX/DR IN RCRD: CPT | Performed by: PHYSICIAN ASSISTANT

## 2024-03-05 ASSESSMENT — ENCOUNTER SYMPTOMS
LOSS OF SENSATION IN FEET: 0
OCCASIONAL FEELINGS OF UNSTEADINESS: 0
DEPRESSION: 0

## 2024-03-05 ASSESSMENT — PAIN SCALES - GENERAL: PAINLEVEL: 7

## 2024-03-05 NOTE — PROGRESS NOTES
[facility] [place]  Department of Neurological Surgery  Established Patient Visit    History of Present Illness  Jeramy Reynoso is a 73 y.o. year old male who presents to the spine clinic in follow up with subarachnoid hemorrhage in right parietal lobe following a mechanical fall at home. Patient states he slipped/tripped on last step falling backwards impacting his side on the step and head. He is not on blood thinners and has avoided nsaids. He denies headache, light/sound sensitivity, balance disturbance, uncoordinated movements, nausea/vomiting, and bowel/bladder incontinence. Denies numbness/tingling or weakness in arms or legs. He endorses mild constipation and stopped taking tramadol. He endorses <5 s instance of dizziness upon standing or turning head occasionally.        Patient's BMI is Body mass index is 26.78 kg/m².     systems reviewed and negative other than what is listed in the history of present illness    Patient Active Problem List   Diagnosis    Seasonal allergic rhinitis    Primary hypertension    Hyperlipemia    Myxomatous mitral valve    Mild mitral regurgitation    Fall, initial encounter     Past Medical History:   Diagnosis Date    Hypertension      Past Surgical History:   Procedure Laterality Date    OTHER SURGICAL HISTORY  2022    Tonsillectomy    TONSILLECTOMY       Social History     Tobacco Use    Smoking status: Former     Types: Cigarettes     Quit date:      Years since quittin.2    Smokeless tobacco: Never   Substance Use Topics    Alcohol use: Yes     Alcohol/week: 4.0 standard drinks of alcohol     Types: 2 Glasses of wine, 2 Shots of liquor per week     family history includes Hypertension in his mother.    Current Outpatient Medications:     ergocalciferol (Vitamin D-2) 1.25 MG (15899 UT) capsule, Take 1 capsule (50,000 Units) by mouth 1 (one) time per week., Disp: 12 capsule, Rfl: 0    lidocaine (Lidoderm) 5 % patch, Place 1 patch over  12 hours on the skin once daily. Remove & discard patch within 12 hours or as directed by MD., Disp: 30 patch, Rfl: 0    lisinopril 10 mg tablet, Take 1 tablet (10 mg) by mouth once daily., Disp: 90 tablet, Rfl: 3    magnesium hydroxide (Milk of Magnesia) 400 mg/5 mL suspension, Take 15 mL by mouth once daily as needed for constipation., Disp: , Rfl:     milk thistle 175 mg tablet, Take 1 tablet (175 mg) by mouth once daily., Disp: , Rfl:     oxyCODONE (Roxicodone) 5 mg immediate release tablet, Take 1 tablet (5 mg) by mouth every 6 hours if needed for severe pain (7 - 10)., Disp: 5 tablet, Rfl: 0    polyethylene glycol (Glycolax, Miralax) 17 gram packet, Take 17 g by mouth once daily. Mix 1 cap (17g) into 8 ounces of fluid., Disp: 30 packet, Rfl: 0    spirometers and accessories device, 1 each once daily. Use as frequently as possible by increasing inhalation., Disp: 1 each, Rfl: 0    traMADol (Ultram) 50 mg tablet, Take 1 tablet (50 mg) by mouth every 8 hours if needed for severe pain (7 - 10) for up to 21 days., Disp: 30 tablet, Rfl: 0  No Known Allergies    Physical Examination:  General: Well developed, awake/alert/oriented x3, no distress, alert and cooperative  Skin: Warm and dry, no lesions, no rashes  ENMT: Mucous membranes moist, no apparent injury, no lesions seen  Head/Neck: Neck Supple, no apparent injury  Respiratory/Thorax: Normal breath sounds with good chest expansion, thorax symmetric  Cardiovascular: No pitting edema, no JVD  CNII-XI largely intact, no drift, no dysmetria, PERRLA, heel-shin tracing performed well. Romberg +     Motor Strength: 5/5 Throughout upper     Muscle Bulk: Normal and symmetric in all extremities     Sensation: intact to light touch        Assessment and Plan:    Jeramy Reynoso is a 73 y.o. year old male who presents to the spine clinic in follow up with subarachnoid hemorrhage in right parietal lobe following a mechanical fall at home. Patient states he slipped/tripped  "on last step falling backwards impacting his side on the step and head. He is not on blood thinners and has avoided nsaids. He denies headache, light/sound sensitivity, balance disturbance, uncoordinated movements, nausea/vomiting, and bowel/bladder incontinence. Denies numbness/tingling or weakness in arms or legs. He endorses mild constipation and stopped taking tramadol. He endorses <5 s instance of dizziness upon standing or turning head occasionally.     Neuro exam largely intact including coordination test. Romberg unsteady with eyes closed. Described as \"like on a boat\". Patient likely with concussion symptoms continuing. Spine CTs reviewed and no obvious source (no central stenosis).   Will obtain repeat head CT and order placed at this time.       The above clinical summary has been dictated with voice recognition software. It has not been proofread for grammatical errors, typographical mistakes, or other semantic inconsistencies.    Thank you for visiting our office today. It was our pleasure to take part in your healthcare.     Do not hesitate to call with any questions regarding your plan of care after leaving at [phone] M-F 8am-4pm.     To clinicians, thank you very much for this kind referral. It is a privilege to partner with you in the care of your patients. My office would be delighted to assist you with any further consultations or with questions regarding the plan of care outlined. Do not hesitate to call the office or contact me directly.       Sincerely,      RONALD Chaney, PAHalC  Associate Physician Assistant, Neurosurgery  Clinical   [facility]    [place] [address] [address] [place] [address]    Phone: [phone]  Fax: [phone]            )  "

## 2024-03-09 DIAGNOSIS — S22.39XA CLOSED FRACTURE OF ONE RIB, UNSPECIFIED LATERALITY, INITIAL ENCOUNTER: ICD-10-CM

## 2024-03-15 RX ORDER — LIDOCAINE 50 MG/G
PATCH TOPICAL
Qty: 30 PATCH | Refills: 0 | Status: SHIPPED | OUTPATIENT
Start: 2024-03-15 | End: 2024-05-16 | Stop reason: ALTCHOICE

## 2024-03-19 ENCOUNTER — HOSPITAL ENCOUNTER (OUTPATIENT)
Dept: RADIOLOGY | Facility: CLINIC | Age: 74
Discharge: HOME | End: 2024-03-19
Payer: MEDICARE

## 2024-03-19 DIAGNOSIS — I60.9 SAH (SUBARACHNOID HEMORRHAGE) (MULTI): ICD-10-CM

## 2024-03-19 PROCEDURE — 70450 CT HEAD/BRAIN W/O DYE: CPT

## 2024-03-19 PROCEDURE — 70450 CT HEAD/BRAIN W/O DYE: CPT | Performed by: RADIOLOGY

## 2024-03-21 ENCOUNTER — TELEPHONE (OUTPATIENT)
Dept: NEUROSURGERY | Facility: HOSPITAL | Age: 74
End: 2024-03-21
Payer: MEDICARE

## 2024-03-21 NOTE — TELEPHONE ENCOUNTER
Result Communication    Resulted Orders   CT head wo IV contrast    Narrative    Interpreted By:  Mary Angeles,   STUDY:  CT HEAD WO IV CONTRAST;  3/19/2024 9:28 am      INDICATION:  Signs/Symptoms:subarachnoid hemorrhage surveillance.      COMPARISON:  02/25/2024      ACCESSION NUMBER(S):  [id number]      ORDERING CLINICIAN:  DIANA OWENS      TECHNIQUE:  Noncontrast axial CT scan of head was performed. Angled reformats in  brain and bone windows were generated. The images were reviewed in  bone, brain, blood and soft tissue windows.      FINDINGS:  CSF Spaces: There is prominence of ventricles and sulci compatible  with diffuse parenchymal volume loss. Previously demonstrated  subarachnoid hemorrhage is no longer identified with certainty. No  new extra-axial collection is identified.      Parenchyma:  There is minimal diminished attenuation in the  subcortical and periventricular white matter. Otherwise, the  grey-white differentiation is intact. There is no mass effect or  midline shift.  There is no intracranial hemorrhage.      Calvarium: The calvarium is unremarkable.      Paranasal sinuses and mastoids: There is mild mucosal thickening  within scattered ethmoid air cells. The mastoid air cells are clear.      Interval resolution of previously demonstrated left parietal scalp  hematoma.        Impression    There has been interval resolution of previously demonstrated  subarachnoid hemorrhage. No new, acute intracranial hemorrhage is  identified.          MACRO:  None      Signed by: Mary Angeles 3/19/2024 9:49 AM  Dictation workstation:   [id number]       3:53 PM      Results were not successfully communicated with the patient and they did not acknowledge their understanding.    Message left on machine that identifies patient. Resolution of prior hemorrhage. No new bleed. Okay to resume meds as needed.

## 2024-04-02 ENCOUNTER — PATIENT OUTREACH (OUTPATIENT)
Dept: CARE COORDINATION | Facility: CLINIC | Age: 74
End: 2024-04-02
Payer: MEDICARE

## 2024-05-09 ENCOUNTER — APPOINTMENT (OUTPATIENT)
Dept: PRIMARY CARE | Facility: CLINIC | Age: 74
End: 2024-05-09
Payer: MEDICARE

## 2024-05-14 ENCOUNTER — APPOINTMENT (OUTPATIENT)
Dept: PRIMARY CARE | Facility: CLINIC | Age: 74
End: 2024-05-14
Payer: MEDICARE

## 2024-05-16 ENCOUNTER — OFFICE VISIT (OUTPATIENT)
Dept: PRIMARY CARE | Facility: CLINIC | Age: 74
End: 2024-05-16
Payer: MEDICARE

## 2024-05-16 VITALS
TEMPERATURE: 98 F | HEART RATE: 84 BPM | RESPIRATION RATE: 16 BRPM | BODY MASS INDEX: 26.28 KG/M2 | HEIGHT: 70 IN | OXYGEN SATURATION: 99 % | SYSTOLIC BLOOD PRESSURE: 128 MMHG | WEIGHT: 183.6 LBS | DIASTOLIC BLOOD PRESSURE: 80 MMHG

## 2024-05-16 DIAGNOSIS — I10 PRIMARY HYPERTENSION: Primary | ICD-10-CM

## 2024-05-16 PROBLEM — R03.0 ELEVATED BLOOD PRESSURE READING WITHOUT DIAGNOSIS OF HYPERTENSION: Status: ACTIVE | Noted: 2023-07-05

## 2024-05-16 PROCEDURE — 1126F AMNT PAIN NOTED NONE PRSNT: CPT | Performed by: INTERNAL MEDICINE

## 2024-05-16 PROCEDURE — 3074F SYST BP LT 130 MM HG: CPT | Performed by: INTERNAL MEDICINE

## 2024-05-16 PROCEDURE — 3079F DIAST BP 80-89 MM HG: CPT | Performed by: INTERNAL MEDICINE

## 2024-05-16 PROCEDURE — 1160F RVW MEDS BY RX/DR IN RCRD: CPT | Performed by: INTERNAL MEDICINE

## 2024-05-16 PROCEDURE — 1036F TOBACCO NON-USER: CPT | Performed by: INTERNAL MEDICINE

## 2024-05-16 PROCEDURE — 1159F MED LIST DOCD IN RCRD: CPT | Performed by: INTERNAL MEDICINE

## 2024-05-16 PROCEDURE — 99212 OFFICE O/P EST SF 10 MIN: CPT | Performed by: INTERNAL MEDICINE

## 2024-05-16 ASSESSMENT — ENCOUNTER SYMPTOMS
ABDOMINAL PAIN: 0
SORE THROAT: 0
FEVER: 0
TROUBLE SWALLOWING: 0
CHILLS: 0
PALPITATIONS: 0
SHORTNESS OF BREATH: 0
HYPERTENSION: 1

## 2024-05-16 ASSESSMENT — PAIN SCALES - GENERAL: PAINLEVEL: 0-NO PAIN

## 2024-05-16 NOTE — PROGRESS NOTES
"Subjective   Jeramy Reynoso is a 73 y.o. male who presents for Follow-up and Hypertension.      Patient has not been checking BP at home until the last couple days   When he first wakes up his BP is in the 140  and then within 20 minutes the reading drops down to 116-120       Hypertension  This is a recurrent problem. The current episode started more than 1 year ago. The problem has been gradually improving since onset. The problem is controlled. Pertinent negatives include no chest pain, palpitations or shortness of breath. There are no associated agents to hypertension. Risk factors for coronary artery disease include male gender. Past treatments include ACE inhibitors. The current treatment provides moderate improvement. There are no compliance problems.        Review of Systems   Constitutional:  Negative for chills and fever.   HENT:  Negative for sore throat and trouble swallowing.    Respiratory:  Negative for shortness of breath.    Cardiovascular:  Negative for chest pain, palpitations and leg swelling.   Gastrointestinal:  Negative for abdominal pain.   Skin:  Negative for rash.       Objective   /80   Pulse 84   Temp 36.7 °C (98 °F)   Resp 16   Ht 1.765 m (5' 9.5\")   Wt 83.3 kg (183 lb 9.6 oz)   SpO2 99%   BMI 26.72 kg/m²       Physical Exam  Constitutional:       Appearance: Normal appearance.   HENT:      Head: Normocephalic.   Pulmonary:      Effort: Pulmonary effort is normal.   Musculoskeletal:      Cervical back: Neck supple.   Skin:     General: Skin is warm and dry.   Psychiatric:         Mood and Affect: Mood normal.         Assessment/Plan   Problem List Items Addressed This Visit       Primary hypertension - Primary     Stable and will continue to follow          Encounter Diagnosis   Name Primary?    Primary hypertension Yes     Randal Balderrama, DO   "

## 2024-05-17 ENCOUNTER — PATIENT OUTREACH (OUTPATIENT)
Dept: CARE COORDINATION | Facility: CLINIC | Age: 74
End: 2024-05-17
Payer: MEDICARE

## 2024-05-17 NOTE — PROGRESS NOTES
Unable to reach patient for 90 day post hospital discharge follow up call.   Left voicemail with call back number for patient to call if needed   If no voicemail available call attempts x 2 were made to contact the patient to assist with any questions or concerns patient may have.

## 2024-05-19 DIAGNOSIS — S22.39XA CLOSED FRACTURE OF ONE RIB, UNSPECIFIED LATERALITY, INITIAL ENCOUNTER: ICD-10-CM

## 2024-05-22 RX ORDER — ERGOCALCIFEROL 1.25 MG/1
1 CAPSULE ORAL
Qty: 12 CAPSULE | Refills: 0 | Status: SHIPPED | OUTPATIENT
Start: 2024-05-26

## 2024-08-17 DIAGNOSIS — S22.39XA CLOSED FRACTURE OF ONE RIB, UNSPECIFIED LATERALITY, INITIAL ENCOUNTER: ICD-10-CM

## 2024-08-19 RX ORDER — ERGOCALCIFEROL 1.25 MG/1
1 CAPSULE ORAL
Qty: 12 CAPSULE | Refills: 3 | Status: SHIPPED | OUTPATIENT
Start: 2024-08-25

## 2024-11-06 DIAGNOSIS — I10 PRIMARY HYPERTENSION: ICD-10-CM

## 2024-11-06 RX ORDER — LISINOPRIL 10 MG/1
10 TABLET ORAL DAILY
Qty: 90 TABLET | Refills: 3 | Status: SHIPPED | OUTPATIENT
Start: 2024-11-06 | End: 2025-11-01

## 2024-11-06 NOTE — TELEPHONE ENCOUNTER
Please call patient to schedule apt, I have sent Dr. Balderrama a refill request for 90 days to approve so will need to be seen before future refill

## 2024-11-07 ENCOUNTER — LAB (OUTPATIENT)
Dept: LAB | Facility: LAB | Age: 74
End: 2024-11-07
Payer: MEDICARE

## 2024-11-07 DIAGNOSIS — E78.2 MIXED HYPERLIPIDEMIA: ICD-10-CM

## 2024-11-07 DIAGNOSIS — Z12.5 ENCOUNTER FOR PROSTATE CANCER SCREENING: ICD-10-CM

## 2024-11-07 DIAGNOSIS — E55.9 VITAMIN D DEFICIENCY, UNSPECIFIED: ICD-10-CM

## 2024-11-07 DIAGNOSIS — Z13.6 SCREENING FOR CARDIOVASCULAR CONDITION: ICD-10-CM

## 2024-11-07 DIAGNOSIS — Z11.59 NEED FOR HEPATITIS C SCREENING TEST: ICD-10-CM

## 2024-11-07 DIAGNOSIS — I10 PRIMARY HYPERTENSION: ICD-10-CM

## 2024-11-07 DIAGNOSIS — Z23 NEED FOR VACCINATION: ICD-10-CM

## 2024-11-07 DIAGNOSIS — Z00.00 ROUTINE GENERAL MEDICAL EXAMINATION AT HEALTH CARE FACILITY: ICD-10-CM

## 2024-11-07 DIAGNOSIS — R01.1 HEART MURMUR PREVIOUSLY UNDIAGNOSED: ICD-10-CM

## 2024-11-07 DIAGNOSIS — S22.39XA CLOSED FRACTURE OF ONE RIB, UNSPECIFIED LATERALITY, INITIAL ENCOUNTER: ICD-10-CM

## 2024-11-07 LAB
25(OH)D3 SERPL-MCNC: 75 NG/ML (ref 30–100)
ALBUMIN SERPL BCP-MCNC: 4.3 G/DL (ref 3.4–5)
ALP SERPL-CCNC: 74 U/L (ref 33–136)
ALT SERPL W P-5'-P-CCNC: 23 U/L (ref 10–52)
ANION GAP SERPL CALC-SCNC: 13 MMOL/L (ref 10–20)
AST SERPL W P-5'-P-CCNC: 19 U/L (ref 9–39)
BILIRUB SERPL-MCNC: 1 MG/DL (ref 0–1.2)
BUN SERPL-MCNC: 13 MG/DL (ref 6–23)
CALCIUM SERPL-MCNC: 9.3 MG/DL (ref 8.6–10.6)
CHLORIDE SERPL-SCNC: 103 MMOL/L (ref 98–107)
CHOLEST SERPL-MCNC: 206 MG/DL (ref 0–199)
CHOLESTEROL/HDL RATIO: 3.4
CO2 SERPL-SCNC: 28 MMOL/L (ref 21–32)
CREAT SERPL-MCNC: 1.14 MG/DL (ref 0.5–1.3)
EGFRCR SERPLBLD CKD-EPI 2021: 67 ML/MIN/1.73M*2
ERYTHROCYTE [DISTWIDTH] IN BLOOD BY AUTOMATED COUNT: 12.7 % (ref 11.5–14.5)
GLUCOSE SERPL-MCNC: 100 MG/DL (ref 74–99)
HCT VFR BLD AUTO: 43.1 % (ref 41–52)
HCV AB SER QL: NONREACTIVE
HDLC SERPL-MCNC: 61.3 MG/DL
HGB BLD-MCNC: 14.8 G/DL (ref 13.5–17.5)
LDLC SERPL CALC-MCNC: 124 MG/DL
MCH RBC QN AUTO: 34.1 PG (ref 26–34)
MCHC RBC AUTO-ENTMCNC: 34.3 G/DL (ref 32–36)
MCV RBC AUTO: 99 FL (ref 80–100)
NON HDL CHOLESTEROL: 145 MG/DL (ref 0–149)
NRBC BLD-RTO: 0 /100 WBCS (ref 0–0)
PLATELET # BLD AUTO: 143 X10*3/UL (ref 150–450)
POTASSIUM SERPL-SCNC: 4.4 MMOL/L (ref 3.5–5.3)
PROT SERPL-MCNC: 7.1 G/DL (ref 6.4–8.2)
PSA SERPL-MCNC: 2.47 NG/ML
RBC # BLD AUTO: 4.34 X10*6/UL (ref 4.5–5.9)
SODIUM SERPL-SCNC: 140 MMOL/L (ref 136–145)
TRIGL SERPL-MCNC: 106 MG/DL (ref 0–149)
TSH SERPL-ACNC: 2.55 MIU/L (ref 0.44–3.98)
VLDL: 21 MG/DL (ref 0–40)
WBC # BLD AUTO: 4.9 X10*3/UL (ref 4.4–11.3)

## 2024-11-07 PROCEDURE — 80053 COMPREHEN METABOLIC PANEL: CPT

## 2024-11-07 PROCEDURE — G0103 PSA SCREENING: HCPCS

## 2024-11-07 PROCEDURE — G0472 HEP C SCREEN HIGH RISK/OTHER: HCPCS

## 2024-11-07 PROCEDURE — 82306 VITAMIN D 25 HYDROXY: CPT

## 2024-11-07 PROCEDURE — 36415 COLL VENOUS BLD VENIPUNCTURE: CPT

## 2024-11-07 PROCEDURE — 85027 COMPLETE CBC AUTOMATED: CPT

## 2024-11-07 PROCEDURE — 80061 LIPID PANEL: CPT

## 2024-11-07 PROCEDURE — 84443 ASSAY THYROID STIM HORMONE: CPT

## 2025-01-03 ENCOUNTER — APPOINTMENT (OUTPATIENT)
Dept: PRIMARY CARE | Facility: CLINIC | Age: 75
End: 2025-01-03
Payer: MEDICARE

## 2025-01-03 VITALS
TEMPERATURE: 98.2 F | BODY MASS INDEX: 26.83 KG/M2 | DIASTOLIC BLOOD PRESSURE: 80 MMHG | HEIGHT: 70 IN | WEIGHT: 187.4 LBS | SYSTOLIC BLOOD PRESSURE: 146 MMHG | RESPIRATION RATE: 16 BRPM | HEART RATE: 78 BPM

## 2025-01-03 DIAGNOSIS — Z87.81 HISTORY OF RIB FRACTURE: ICD-10-CM

## 2025-01-03 DIAGNOSIS — M85.88 OSTEOPENIA OF SPINE: ICD-10-CM

## 2025-01-03 DIAGNOSIS — Z78.9 ALCOHOL USE: ICD-10-CM

## 2025-01-03 DIAGNOSIS — Z12.11 COLON CANCER SCREENING: ICD-10-CM

## 2025-01-03 DIAGNOSIS — Z23 NEED FOR INFLUENZA VACCINATION: ICD-10-CM

## 2025-01-03 DIAGNOSIS — M43.9 COMPRESSION DEFORMITY OF VERTEBRA: ICD-10-CM

## 2025-01-03 DIAGNOSIS — Z00.00 ROUTINE GENERAL MEDICAL EXAMINATION AT HEALTH CARE FACILITY: Primary | ICD-10-CM

## 2025-01-03 DIAGNOSIS — I10 PRIMARY HYPERTENSION: ICD-10-CM

## 2025-01-03 RX ORDER — LOSARTAN POTASSIUM 25 MG/1
25 TABLET ORAL DAILY
Qty: 100 TABLET | Refills: 3 | Status: SHIPPED | OUTPATIENT
Start: 2025-01-03 | End: 2026-02-07

## 2025-01-03 RX ORDER — SODIUM FLUORIDE/POTASSIUM NIT 1.1 %-5 %
PASTE (ML) DENTAL
COMMUNITY
Start: 2024-10-31 | End: 2025-01-03 | Stop reason: ALTCHOICE

## 2025-01-03 ASSESSMENT — ACTIVITIES OF DAILY LIVING (ADL)
MANAGING_FINANCES: INDEPENDENT
GROCERY_SHOPPING: INDEPENDENT
DRESSING: INDEPENDENT
BATHING: INDEPENDENT
TAKING_MEDICATION: INDEPENDENT
DOING_HOUSEWORK: INDEPENDENT

## 2025-01-03 ASSESSMENT — ENCOUNTER SYMPTOMS
FEVER: 0
FREQUENCY: 0
POLYDIPSIA: 0
COUGH: 1
OCCASIONAL FEELINGS OF UNSTEADINESS: 0
CONSTIPATION: 0
SHORTNESS OF BREATH: 0
UNEXPECTED WEIGHT CHANGE: 0
LOSS OF SENSATION IN FEET: 0
DYSPHORIC MOOD: 0
PALPITATIONS: 0
HEADACHES: 0
VOMITING: 0
CHEST TIGHTNESS: 0
NAUSEA: 0
NECK PAIN: 0
DIARRHEA: 0
ARTHRALGIAS: 0
DIZZINESS: 0
DEPRESSION: 0

## 2025-01-03 ASSESSMENT — PATIENT HEALTH QUESTIONNAIRE - PHQ9
1. LITTLE INTEREST OR PLEASURE IN DOING THINGS: NOT AT ALL
2. FEELING DOWN, DEPRESSED OR HOPELESS: NOT AT ALL
SUM OF ALL RESPONSES TO PHQ9 QUESTIONS 1 AND 2: 0

## 2025-01-03 ASSESSMENT — PAIN SCALES - GENERAL: PAINLEVEL_OUTOF10: 0-NO PAIN

## 2025-01-03 NOTE — PROGRESS NOTES
"Subjective   Reason for Visit: Jeramy Reynoso is an 74 y.o. male here for a Medicare Wellness visit.     Patient wants to get his vaccines done at [facility]     Patient believes he has a living will and POA but not on file, will confirm with wife to make sure he has them   PSA:11.7.2024  Cologuard: 11.22.2022        Patient Care Team:  Randal Balderrama DO as PCP - General  Randal Balderrama DO as PCP - O Medicare Advantage PCP     Review of Systems   Constitutional:  Negative for fever and unexpected weight change.   HENT:  Negative for congestion and ear pain.    Eyes:  Negative for visual disturbance.   Respiratory:  Positive for cough. Negative for chest tightness and shortness of breath.         Cough on and off since the fall.  1 to 2 coughs per episode then resolves.    Does get runny nose.  History of mold in the home and had reclamation.    Basement was rebuilt.     Cardiovascular:  Negative for chest pain, palpitations and leg swelling.   Gastrointestinal:  Negative for constipation, diarrhea, nausea and vomiting.   Endocrine: Negative for polydipsia and polyuria.   Genitourinary:  Negative for frequency and urgency.   Musculoskeletal:  Negative for arthralgias and neck pain.   Skin:  Negative for rash.   Neurological:  Negative for dizziness and headaches.   Psychiatric/Behavioral:  Negative for dysphoric mood.    All other systems reviewed and are negative.      Objective   Vitals:  /80   Pulse 78   Temp 36.8 °C (98.2 °F)   Resp 16   Ht 1.765 m (5' 9.5\")   Wt 85 kg (187 lb 6.4 oz)   BMI 27.28 kg/m²       Physical Exam  Constitutional:       Appearance: Normal appearance.   HENT:      Head: Normocephalic.   Eyes:      Conjunctiva/sclera: Conjunctivae normal.   Cardiovascular:      Rate and Rhythm: Normal rate and regular rhythm.      Heart sounds: Normal heart sounds.   Pulmonary:      Effort: Pulmonary effort is normal.      Breath sounds: Normal breath sounds.   Musculoskeletal:      " "Cervical back: Neck supple.   Skin:     General: Skin is warm and dry.   Neurological:      Mental Status: He is alert.         Advance Care Planning was discussed today with patient and/or family participant.  Patient has capacity to make their own medical decisions and express their wishes.    Advance Directives, Living Will, Health Care Power of  will be made available and added to file as noted in Advanced Directives section.  Wife is \"the \" and will confirm with her that this is handled.  Discussed patient health status, expectations for care, quality of life is prioritized; willing to accept low-burden treatments to achieve meaningful goals.  Patient is aware that this will be reviewed annually and they can update advanced care directives at any time.    Total face to face time spent on advance care planning was 16 minutes.    Assessment & Plan  Routine general medical examination at health care facility    Orders:    1 Year Follow Up In [facility] - PCP - Wellness Exam; Future    Colon cancer screening    Orders:    Cologuard® colon cancer screening; Future    Cologuard® colon cancer screening    Primary hypertension    Orders:    losartan (Cozaar) 25 mg tablet; Take 1 tablet (25 mg) by mouth once daily.    Alcohol use  Discussed at length the need to decrease for 3 to one drink per day.           Compression deformity of vertebra         Osteopenia of spine         Need for influenza vaccination    Orders:    Flu vaccine, trivalent, preservative free, HIGH-DOSE, age 65y+ (Fluzone)    History of rib fracture    Orders:    XR DEXA bone density; Future           Advance Directives Discussion  16 - 20 minutes were spent discussing Advanced Care Planning (including a Living Will, Medical Power Of , as well as specific end of life choices and/or directives). The details of that discussion were documented in Advanced Directives Discussion section of the medical record.     Cardiac " "Risk Assessment  15 - 20 minutes were spent discussing Cardiovascular risk and, if needed, lifestyle modifications were recommended, including nutritional choices, exercise, and elimination of habits contributing to risk.   Aspirin use/disuse was discussed following the guidelines below:  low dose ASA ( mg) should be considered:    If prior Heart Attack/Stroke/Peripheral vascular disease:  Generally recommend daily low dose aspirin unless extremely high bleeding risk (e.g., gastrointestinal).    If no prior Heart Attack/Stroke/Peripheral vascular disease:              Age over 70: Do not use Aspirin for prevention    Age less than 70 and 10-year cardiovascular disease risk is >20%: use low dose Aspirin for prevention.                 Depression Screening  5 - 10 minutes were spent screening for depression.  Discussed his Wife's recent cancer treatment and has done well with this.  Feels \"PTSD\" like effects but overall getting better with her improvement.      "

## 2025-01-06 ENCOUNTER — HOSPITAL ENCOUNTER (OUTPATIENT)
Dept: RADIOLOGY | Facility: HOSPITAL | Age: 75
Discharge: HOME | End: 2025-01-06
Payer: MEDICARE

## 2025-01-06 DIAGNOSIS — Z87.81 HISTORY OF RIB FRACTURE: ICD-10-CM

## 2025-01-06 PROCEDURE — 77080 DXA BONE DENSITY AXIAL: CPT

## 2025-01-06 PROCEDURE — 77080 DXA BONE DENSITY AXIAL: CPT | Performed by: RADIOLOGY

## 2025-01-31 ENCOUNTER — APPOINTMENT (OUTPATIENT)
Dept: PRIMARY CARE | Facility: CLINIC | Age: 75
End: 2025-01-31
Payer: MEDICARE

## 2025-01-31 VITALS — SYSTOLIC BLOOD PRESSURE: 132 MMHG | HEART RATE: 74 BPM | DIASTOLIC BLOOD PRESSURE: 72 MMHG

## 2025-01-31 DIAGNOSIS — I10 PRIMARY HYPERTENSION: ICD-10-CM

## 2025-01-31 PROCEDURE — 1123F ACP DISCUSS/DSCN MKR DOCD: CPT | Performed by: INTERNAL MEDICINE

## 2025-01-31 PROCEDURE — 3078F DIAST BP <80 MM HG: CPT | Performed by: INTERNAL MEDICINE

## 2025-01-31 PROCEDURE — 3075F SYST BP GE 130 - 139MM HG: CPT | Performed by: INTERNAL MEDICINE

## 2025-01-31 NOTE — PROGRESS NOTES
Patient is here for a blood pressure check   Patient is checking BP at home, with readings of 120/70s  Will continue monitoring and bring home readings at next visit

## 2026-01-07 ENCOUNTER — APPOINTMENT (OUTPATIENT)
Dept: PRIMARY CARE | Facility: CLINIC | Age: 76
End: 2026-01-07
Payer: MEDICARE